# Patient Record
Sex: FEMALE | Race: OTHER | HISPANIC OR LATINO
[De-identification: names, ages, dates, MRNs, and addresses within clinical notes are randomized per-mention and may not be internally consistent; named-entity substitution may affect disease eponyms.]

---

## 2017-01-17 ENCOUNTER — APPOINTMENT (OUTPATIENT)
Dept: RHEUMATOLOGY | Facility: CLINIC | Age: 52
End: 2017-01-17

## 2017-01-17 VITALS
HEART RATE: 66 BPM | HEIGHT: 61 IN | WEIGHT: 112 LBS | SYSTOLIC BLOOD PRESSURE: 102 MMHG | OXYGEN SATURATION: 99 % | RESPIRATION RATE: 16 BRPM | DIASTOLIC BLOOD PRESSURE: 69 MMHG | BODY MASS INDEX: 21.14 KG/M2

## 2017-01-17 LAB
APPEARANCE: CLEAR
BACTERIA: NEGATIVE
BASOPHILS # BLD AUTO: 0.03 K/UL
BASOPHILS NFR BLD AUTO: 0.8 %
BILIRUBIN URINE: NEGATIVE
BLOOD URINE: NEGATIVE
COLOR: YELLOW
CREAT SPEC-SCNC: 52 MG/DL
CREAT/PROT UR: 0.5 RATIO
EOSINOPHIL # BLD AUTO: 0.05 K/UL
EOSINOPHIL NFR BLD AUTO: 1.3 %
GLUCOSE QUALITATIVE U: NORMAL MG/DL
HCT VFR BLD CALC: 35.9 %
HGB BLD-MCNC: 11.4 G/DL
IMM GRANULOCYTES NFR BLD AUTO: 0 %
KETONES URINE: NEGATIVE
LEUKOCYTE ESTERASE URINE: NEGATIVE
LYMPHOCYTES # BLD AUTO: 1.67 K/UL
LYMPHOCYTES NFR BLD AUTO: 43.8 %
MAN DIFF?: NORMAL
MCHC RBC-ENTMCNC: 28.1 PG
MCHC RBC-ENTMCNC: 31.8 GM/DL
MCV RBC AUTO: 88.6 FL
MICROSCOPIC-UA: NORMAL
MONOCYTES # BLD AUTO: 0.43 K/UL
MONOCYTES NFR BLD AUTO: 11.3 %
NEUTROPHILS # BLD AUTO: 1.63 K/UL
NEUTROPHILS NFR BLD AUTO: 42.8 %
NITRITE URINE: NEGATIVE
PH URINE: 7.5
PLATELET # BLD AUTO: 247 K/UL
PROT UR-MCNC: 26 MG/DL
PROTEIN URINE: 30 MG/DL
RBC # BLD: 4.05 M/UL
RBC # FLD: 13.8 %
RED BLOOD CELLS URINE: 1 /HPF
SPECIFIC GRAVITY URINE: 1.01
SQUAMOUS EPITHELIAL CELLS: 0 /HPF
UROBILINOGEN URINE: NORMAL MG/DL
WBC # FLD AUTO: 3.81 K/UL
WHITE BLOOD CELLS URINE: 0 /HPF

## 2017-01-18 LAB
ALBUMIN SERPL ELPH-MCNC: 4.1 G/DL
ALP BLD-CCNC: 85 U/L
ALT SERPL-CCNC: 11 U/L
ANION GAP SERPL CALC-SCNC: 18 MMOL/L
AST SERPL-CCNC: 20 U/L
BILIRUB SERPL-MCNC: 0.6 MG/DL
BUN SERPL-MCNC: 17 MG/DL
C3 SERPL-MCNC: 118 MG/DL
C4 SERPL-MCNC: 28 MG/DL
CALCIUM SERPL-MCNC: 10.4 MG/DL
CHLORIDE SERPL-SCNC: 100 MMOL/L
CK SERPL-CCNC: 130 U/L
CO2 SERPL-SCNC: 22 MMOL/L
CREAT SERPL-MCNC: 1.06 MG/DL
DSDNA AB SER-ACNC: <12 IU/ML
GLUCOSE SERPL-MCNC: 97 MG/DL
POTASSIUM SERPL-SCNC: 4.2 MMOL/L
PROT SERPL-MCNC: 7.2 G/DL
SODIUM SERPL-SCNC: 140 MMOL/L
TSH SERPL-ACNC: 1.85 UIU/ML

## 2017-01-19 LAB
ENA RNP AB SER IA-ACNC: 4.1 AL
ENA SM AB SER IA-ACNC: 0.2 AL
ENA SS-A AB SER IA-ACNC: <0.2 AL
ENA SS-B AB SER IA-ACNC: <0.2 AL

## 2017-01-30 ENCOUNTER — OUTPATIENT (OUTPATIENT)
Dept: OUTPATIENT SERVICES | Facility: HOSPITAL | Age: 52
LOS: 1 days | End: 2017-01-30
Payer: MEDICAID

## 2017-01-30 ENCOUNTER — APPOINTMENT (OUTPATIENT)
Dept: INTERNAL MEDICINE | Facility: CLINIC | Age: 52
End: 2017-01-30

## 2017-01-30 ENCOUNTER — LABORATORY RESULT (OUTPATIENT)
Age: 52
End: 2017-01-30

## 2017-01-30 VITALS
OXYGEN SATURATION: 99 % | HEART RATE: 77 BPM | RESPIRATION RATE: 14 BRPM | DIASTOLIC BLOOD PRESSURE: 70 MMHG | SYSTOLIC BLOOD PRESSURE: 102 MMHG

## 2017-01-30 VITALS
DIASTOLIC BLOOD PRESSURE: 70 MMHG | HEIGHT: 61 IN | WEIGHT: 108 LBS | BODY MASS INDEX: 20.39 KG/M2 | SYSTOLIC BLOOD PRESSURE: 100 MMHG | TEMPERATURE: 99 F

## 2017-01-30 DIAGNOSIS — I10 ESSENTIAL (PRIMARY) HYPERTENSION: ICD-10-CM

## 2017-01-30 PROCEDURE — G0463: CPT

## 2017-01-30 PROCEDURE — 87798 DETECT AGENT NOS DNA AMP: CPT

## 2017-01-30 PROCEDURE — 87486 CHLMYD PNEUM DNA AMP PROBE: CPT

## 2017-01-30 PROCEDURE — 87633 RESP VIRUS 12-25 TARGETS: CPT

## 2017-01-30 PROCEDURE — 87581 M.PNEUMON DNA AMP PROBE: CPT

## 2017-01-31 DIAGNOSIS — R68.89 OTHER GENERAL SYMPTOMS AND SIGNS: ICD-10-CM

## 2017-01-31 LAB — RAPID RVP RESULT: SIGNIFICANT CHANGE UP

## 2017-02-07 ENCOUNTER — APPOINTMENT (OUTPATIENT)
Dept: NEUROLOGY | Facility: HOSPITAL | Age: 52
End: 2017-02-07

## 2017-02-07 ENCOUNTER — APPOINTMENT (OUTPATIENT)
Dept: GASTROENTEROLOGY | Facility: HOSPITAL | Age: 52
End: 2017-02-07

## 2017-02-07 ENCOUNTER — OUTPATIENT (OUTPATIENT)
Dept: OUTPATIENT SERVICES | Facility: HOSPITAL | Age: 52
LOS: 1 days | End: 2017-02-07
Payer: MEDICAID

## 2017-02-07 VITALS
WEIGHT: 107 LBS | SYSTOLIC BLOOD PRESSURE: 108 MMHG | HEART RATE: 80 BPM | BODY MASS INDEX: 20.2 KG/M2 | DIASTOLIC BLOOD PRESSURE: 76 MMHG | HEIGHT: 61 IN

## 2017-02-07 VITALS
HEART RATE: 80 BPM | DIASTOLIC BLOOD PRESSURE: 76 MMHG | HEIGHT: 61 IN | WEIGHT: 107 LBS | SYSTOLIC BLOOD PRESSURE: 108 MMHG | BODY MASS INDEX: 20.2 KG/M2

## 2017-02-07 DIAGNOSIS — G43.909 MIGRAINE, UNSPECIFIED, NOT INTRACTABLE, WITHOUT STATUS MIGRAINOSUS: ICD-10-CM

## 2017-02-07 DIAGNOSIS — H83.2X3 LABYRINTHINE DYSFUNCTION, BILATERAL: ICD-10-CM

## 2017-02-07 DIAGNOSIS — R10.9 UNSPECIFIED ABDOMINAL PAIN: ICD-10-CM

## 2017-02-07 DIAGNOSIS — K21.9 GASTRO-ESOPHAGEAL REFLUX DISEASE WITHOUT ESOPHAGITIS: ICD-10-CM

## 2017-02-07 DIAGNOSIS — K31.9 DISEASE OF STOMACH AND DUODENUM, UNSPECIFIED: ICD-10-CM

## 2017-02-07 DIAGNOSIS — R56.9 UNSPECIFIED CONVULSIONS: ICD-10-CM

## 2017-02-07 DIAGNOSIS — F39 UNSPECIFIED MOOD [AFFECTIVE] DISORDER: ICD-10-CM

## 2017-02-07 PROCEDURE — G0463: CPT

## 2017-02-07 RX ORDER — ACETAMINOPHEN, DEXTROMETHORPHAN HYDROBROMIDE, DOXYLAMINE SUCCINATE, AND PHENYLEPHRINE HYDROCHLORIDE 10; 12.5; 20; 65 MG/30ML; MG/30ML; MG/30ML; MG/30ML
5-6.25-10-325 SOLUTION ORAL
Qty: 1 | Refills: 0 | Status: DISCONTINUED | COMMUNITY
Start: 2017-01-30 | End: 2017-02-07

## 2017-03-28 ENCOUNTER — RX RENEWAL (OUTPATIENT)
Age: 52
End: 2017-03-28

## 2017-05-02 ENCOUNTER — APPOINTMENT (OUTPATIENT)
Dept: GASTROENTEROLOGY | Facility: HOSPITAL | Age: 52
End: 2017-05-02

## 2017-05-02 ENCOUNTER — OUTPATIENT (OUTPATIENT)
Dept: OUTPATIENT SERVICES | Facility: HOSPITAL | Age: 52
LOS: 1 days | End: 2017-05-02
Payer: MEDICAID

## 2017-05-02 ENCOUNTER — APPOINTMENT (OUTPATIENT)
Dept: NEUROLOGY | Facility: HOSPITAL | Age: 52
End: 2017-05-02

## 2017-05-02 VITALS
HEIGHT: 61 IN | BODY MASS INDEX: 19.45 KG/M2 | RESPIRATION RATE: 14 BRPM | DIASTOLIC BLOOD PRESSURE: 68 MMHG | HEART RATE: 69 BPM | WEIGHT: 103 LBS | SYSTOLIC BLOOD PRESSURE: 108 MMHG

## 2017-05-02 VITALS
WEIGHT: 103 LBS | HEART RATE: 69 BPM | BODY MASS INDEX: 19.45 KG/M2 | HEIGHT: 61 IN | DIASTOLIC BLOOD PRESSURE: 68 MMHG | SYSTOLIC BLOOD PRESSURE: 108 MMHG

## 2017-05-02 DIAGNOSIS — R10.9 UNSPECIFIED ABDOMINAL PAIN: ICD-10-CM

## 2017-05-02 DIAGNOSIS — R13.10 DYSPHAGIA, UNSPECIFIED: ICD-10-CM

## 2017-05-02 DIAGNOSIS — K30 FUNCTIONAL DYSPEPSIA: ICD-10-CM

## 2017-05-02 DIAGNOSIS — K21.9 GASTRO-ESOPHAGEAL REFLUX DISEASE WITHOUT ESOPHAGITIS: ICD-10-CM

## 2017-05-02 DIAGNOSIS — R51 HEADACHE: ICD-10-CM

## 2017-05-02 DIAGNOSIS — G43.909 MIGRAINE, UNSPECIFIED, NOT INTRACTABLE, WITHOUT STATUS MIGRAINOSUS: ICD-10-CM

## 2017-05-02 PROCEDURE — 86235 NUCLEAR ANTIGEN ANTIBODY: CPT

## 2017-05-02 PROCEDURE — G0463: CPT

## 2017-05-02 PROCEDURE — 36415 COLL VENOUS BLD VENIPUNCTURE: CPT

## 2017-05-02 PROCEDURE — 86255 FLUORESCENT ANTIBODY SCREEN: CPT

## 2017-05-02 PROCEDURE — 86038 ANTINUCLEAR ANTIBODIES: CPT

## 2017-05-02 RX ORDER — AZITHROMYCIN 250 MG/1
250 TABLET, FILM COATED ORAL
Qty: 6 | Refills: 3 | Status: DISCONTINUED | COMMUNITY
Start: 2017-01-17 | End: 2017-05-02

## 2017-05-02 RX ORDER — BENZOCAINE/MENTH/CETYLPYRD CL 15 MG-4 MG
15-4 LOZENGE MUCOUS MEMBRANE
Qty: 1 | Refills: 0 | Status: DISCONTINUED | COMMUNITY
Start: 2017-01-30 | End: 2017-05-02

## 2017-05-03 LAB
CENTROMERE AB SER-ACNC: <0.2 AI — SIGNIFICANT CHANGE UP
ENA SCL70 AB SER-ACNC: 0.2 AI — SIGNIFICANT CHANGE UP

## 2017-05-04 LAB
ANA PAT FLD IF-IMP: ABNORMAL
ANA TITR SER: ABNORMAL

## 2017-05-16 ENCOUNTER — APPOINTMENT (OUTPATIENT)
Dept: DERMATOLOGY | Facility: CLINIC | Age: 52
End: 2017-05-16

## 2017-05-16 VITALS
DIASTOLIC BLOOD PRESSURE: 60 MMHG | SYSTOLIC BLOOD PRESSURE: 106 MMHG | HEIGHT: 63 IN | BODY MASS INDEX: 20.2 KG/M2 | WEIGHT: 114 LBS

## 2017-05-16 DIAGNOSIS — Z91.89 OTHER SPECIFIED PERSONAL RISK FACTORS, NOT ELSEWHERE CLASSIFIED: ICD-10-CM

## 2017-05-17 ENCOUNTER — APPOINTMENT (OUTPATIENT)
Dept: DERMATOLOGY | Facility: CLINIC | Age: 52
End: 2017-05-17

## 2017-05-17 VITALS — SYSTOLIC BLOOD PRESSURE: 100 MMHG | DIASTOLIC BLOOD PRESSURE: 70 MMHG

## 2017-05-19 ENCOUNTER — APPOINTMENT (OUTPATIENT)
Dept: DERMATOLOGY | Facility: CLINIC | Age: 52
End: 2017-05-19

## 2017-05-26 ENCOUNTER — APPOINTMENT (OUTPATIENT)
Dept: RHEUMATOLOGY | Facility: CLINIC | Age: 52
End: 2017-05-26

## 2017-05-26 ENCOUNTER — LABORATORY RESULT (OUTPATIENT)
Age: 52
End: 2017-05-26

## 2017-05-26 VITALS
HEART RATE: 59 BPM | OXYGEN SATURATION: 96 % | DIASTOLIC BLOOD PRESSURE: 68 MMHG | WEIGHT: 115 LBS | SYSTOLIC BLOOD PRESSURE: 104 MMHG | HEIGHT: 63 IN | BODY MASS INDEX: 20.38 KG/M2

## 2017-05-26 LAB
25(OH)D3 SERPL-MCNC: 48 NG/ML
ALBUMIN SERPL ELPH-MCNC: 4.1 G/DL
ALP BLD-CCNC: 98 U/L
ALT SERPL-CCNC: 8 U/L
ANION GAP SERPL CALC-SCNC: 17 MMOL/L
APPEARANCE: CLEAR
AST SERPL-CCNC: 15 U/L
BACTERIA: NEGATIVE
BASOPHILS # BLD AUTO: 0.01 K/UL
BASOPHILS NFR BLD AUTO: 0.4 %
BILIRUB SERPL-MCNC: 0.5 MG/DL
BILIRUBIN URINE: NEGATIVE
BLOOD URINE: NEGATIVE
BUN SERPL-MCNC: 16 MG/DL
CALCIUM SERPL-MCNC: 9.5 MG/DL
CHLORIDE SERPL-SCNC: 103 MMOL/L
CHOLEST SERPL-MCNC: 179 MG/DL
CHOLEST/HDLC SERPL: 3 RATIO
CK SERPL-CCNC: 49 U/L
CO2 SERPL-SCNC: 20 MMOL/L
COLOR: YELLOW
CREAT SERPL-MCNC: 1.26 MG/DL
CREAT SPEC-SCNC: 97 MG/DL
CREAT/PROT UR: 0.4 RATIO
ENA SCL70 IGG SER IA-ACNC: 0.2 AL
EOSINOPHIL # BLD AUTO: 0.03 K/UL
EOSINOPHIL NFR BLD AUTO: 1.1 %
GLUCOSE QUALITATIVE U: NORMAL MG/DL
GLUCOSE SERPL-MCNC: 92 MG/DL
HCT VFR BLD CALC: 34.8 %
HDLC SERPL-MCNC: 59 MG/DL
HGB BLD-MCNC: 11 G/DL
HYALINE CASTS: 1 /LPF
IMM GRANULOCYTES NFR BLD AUTO: 0 %
KETONES URINE: NEGATIVE
LDLC SERPL CALC-MCNC: 95 MG/DL
LEUKOCYTE ESTERASE URINE: NEGATIVE
LYMPHOCYTES # BLD AUTO: 1.05 K/UL
LYMPHOCYTES NFR BLD AUTO: 37 %
MAN DIFF?: NORMAL
MCHC RBC-ENTMCNC: 27.8 PG
MCHC RBC-ENTMCNC: 31.6 GM/DL
MCV RBC AUTO: 88.1 FL
MICROSCOPIC-UA: NORMAL
MONOCYTES # BLD AUTO: 0.44 K/UL
MONOCYTES NFR BLD AUTO: 15.5 %
NEUTROPHILS # BLD AUTO: 1.31 K/UL
NEUTROPHILS NFR BLD AUTO: 46 %
NITRITE URINE: NEGATIVE
PH URINE: 6
PLATELET # BLD AUTO: 240 K/UL
POTASSIUM SERPL-SCNC: 4.8 MMOL/L
PROT SERPL-MCNC: 7.2 G/DL
PROT UR-MCNC: 41 MG/DL
PROTEIN URINE: 30 MG/DL
RBC # BLD: 3.95 M/UL
RBC # FLD: 13.4 %
RED BLOOD CELLS URINE: 2 /HPF
SODIUM SERPL-SCNC: 140 MMOL/L
SPECIFIC GRAVITY URINE: 1.02
SQUAMOUS EPITHELIAL CELLS: 0 /HPF
TRIGL SERPL-MCNC: 126 MG/DL
TSH SERPL-ACNC: 2.5 UIU/ML
UROBILINOGEN URINE: NORMAL MG/DL
WBC # FLD AUTO: 2.84 K/UL
WHITE BLOOD CELLS URINE: 1 /HPF

## 2017-05-26 RX ORDER — NORTRIPTYLINE HYDROCHLORIDE 50 MG/1
50 CAPSULE ORAL
Qty: 30 | Refills: 2 | Status: DISCONTINUED | COMMUNITY
Start: 2017-02-07 | End: 2017-05-26

## 2017-05-27 LAB
C3 SERPL-MCNC: 104 MG/DL
C4 SERPL-MCNC: 25 MG/DL

## 2017-05-28 LAB — DSDNA AB SER-ACNC: <12 IU/ML

## 2017-06-05 ENCOUNTER — APPOINTMENT (OUTPATIENT)
Dept: DERMATOLOGY | Facility: CLINIC | Age: 52
End: 2017-06-05

## 2017-07-01 ENCOUNTER — OUTPATIENT (OUTPATIENT)
Dept: OUTPATIENT SERVICES | Facility: HOSPITAL | Age: 52
LOS: 1 days | End: 2017-07-01
Payer: MEDICAID

## 2017-07-25 ENCOUNTER — RX RENEWAL (OUTPATIENT)
Age: 52
End: 2017-07-25

## 2017-07-25 ENCOUNTER — APPOINTMENT (OUTPATIENT)
Dept: NEUROLOGY | Facility: HOSPITAL | Age: 52
End: 2017-07-25

## 2017-07-25 ENCOUNTER — APPOINTMENT (OUTPATIENT)
Dept: GASTROENTEROLOGY | Facility: HOSPITAL | Age: 52
End: 2017-07-25

## 2017-07-25 ENCOUNTER — OUTPATIENT (OUTPATIENT)
Dept: OUTPATIENT SERVICES | Facility: HOSPITAL | Age: 52
LOS: 1 days | End: 2017-07-25
Payer: MEDICAID

## 2017-07-25 VITALS
SYSTOLIC BLOOD PRESSURE: 103 MMHG | WEIGHT: 113 LBS | HEART RATE: 68 BPM | BODY MASS INDEX: 20.02 KG/M2 | HEIGHT: 63 IN | DIASTOLIC BLOOD PRESSURE: 66 MMHG

## 2017-07-25 VITALS
WEIGHT: 113 LBS | BODY MASS INDEX: 20.02 KG/M2 | SYSTOLIC BLOOD PRESSURE: 103 MMHG | DIASTOLIC BLOOD PRESSURE: 66 MMHG | HEART RATE: 68 BPM | HEIGHT: 63 IN

## 2017-07-25 DIAGNOSIS — R10.9 UNSPECIFIED ABDOMINAL PAIN: ICD-10-CM

## 2017-07-25 DIAGNOSIS — R51 HEADACHE: ICD-10-CM

## 2017-07-25 DIAGNOSIS — G43.909 MIGRAINE, UNSPECIFIED, NOT INTRACTABLE, WITHOUT STATUS MIGRAINOSUS: ICD-10-CM

## 2017-07-25 DIAGNOSIS — K21.9 GASTRO-ESOPHAGEAL REFLUX DISEASE WITHOUT ESOPHAGITIS: ICD-10-CM

## 2017-07-25 DIAGNOSIS — R69 ILLNESS, UNSPECIFIED: ICD-10-CM

## 2017-07-25 PROCEDURE — G0463: CPT

## 2017-07-25 RX ORDER — POTASSIUM BICARB/MAG COMBO 21 500-300 MG
300-500 POWDER EFFERVESCENT (GRAM) ORAL
Qty: 1 | Refills: 0 | Status: COMPLETED | COMMUNITY
Start: 2017-07-25

## 2017-07-25 RX ORDER — METHYLPREDNISOLONE 4 MG/1
4 TABLET ORAL
Qty: 21 | Refills: 0 | Status: COMPLETED | COMMUNITY
Start: 2017-05-02 | End: 2017-07-25

## 2017-07-27 ENCOUNTER — RX RENEWAL (OUTPATIENT)
Age: 52
End: 2017-07-27

## 2017-07-29 ENCOUNTER — EMERGENCY (EMERGENCY)
Facility: HOSPITAL | Age: 52
LOS: 1 days | Discharge: HOME | End: 2017-07-29
Attending: EMERGENCY MEDICINE | Admitting: EMERGENCY MEDICINE
Payer: MEDICAID

## 2017-07-29 VITALS
HEART RATE: 88 BPM | OXYGEN SATURATION: 98 % | DIASTOLIC BLOOD PRESSURE: 94 MMHG | TEMPERATURE: 98 F | RESPIRATION RATE: 20 BRPM | SYSTOLIC BLOOD PRESSURE: 134 MMHG

## 2017-07-29 VITALS
HEART RATE: 64 BPM | DIASTOLIC BLOOD PRESSURE: 80 MMHG | SYSTOLIC BLOOD PRESSURE: 119 MMHG | RESPIRATION RATE: 18 BRPM | OXYGEN SATURATION: 96 %

## 2017-07-29 LAB
ALBUMIN SERPL ELPH-MCNC: 4.3 G/DL — SIGNIFICANT CHANGE UP (ref 3.3–5)
ALP SERPL-CCNC: 85 U/L — SIGNIFICANT CHANGE UP (ref 40–120)
ALT FLD-CCNC: 13 U/L RC — SIGNIFICANT CHANGE UP (ref 10–45)
ANION GAP SERPL CALC-SCNC: 13 MMOL/L — SIGNIFICANT CHANGE UP (ref 5–17)
AST SERPL-CCNC: 20 U/L — SIGNIFICANT CHANGE UP (ref 10–40)
BASOPHILS # BLD AUTO: 0.1 K/UL — SIGNIFICANT CHANGE UP (ref 0–0.2)
BASOPHILS NFR BLD AUTO: 1.7 % — SIGNIFICANT CHANGE UP (ref 0–2)
BILIRUB SERPL-MCNC: 0.6 MG/DL — SIGNIFICANT CHANGE UP (ref 0.2–1.2)
BUN SERPL-MCNC: 18 MG/DL — SIGNIFICANT CHANGE UP (ref 7–23)
CALCIUM SERPL-MCNC: 9.7 MG/DL — SIGNIFICANT CHANGE UP (ref 8.4–10.5)
CHLORIDE SERPL-SCNC: 108 MMOL/L — SIGNIFICANT CHANGE UP (ref 96–108)
CO2 SERPL-SCNC: 22 MMOL/L — SIGNIFICANT CHANGE UP (ref 22–31)
CREAT SERPL-MCNC: 1.08 MG/DL — SIGNIFICANT CHANGE UP (ref 0.5–1.3)
EOSINOPHIL # BLD AUTO: 0 K/UL — SIGNIFICANT CHANGE UP (ref 0–0.5)
EOSINOPHIL NFR BLD AUTO: 0.6 % — SIGNIFICANT CHANGE UP (ref 0–6)
GLUCOSE SERPL-MCNC: 89 MG/DL — SIGNIFICANT CHANGE UP (ref 70–99)
HCG SERPL-ACNC: 3.7 MIU/ML — SIGNIFICANT CHANGE UP
HCT VFR BLD CALC: 37.1 % — SIGNIFICANT CHANGE UP (ref 34.5–45)
HGB BLD-MCNC: 12.3 G/DL — SIGNIFICANT CHANGE UP (ref 11.5–15.5)
LYMPHOCYTES # BLD AUTO: 1.8 K/UL — SIGNIFICANT CHANGE UP (ref 1–3.3)
LYMPHOCYTES # BLD AUTO: 47.4 % — HIGH (ref 13–44)
MCHC RBC-ENTMCNC: 29.8 PG — SIGNIFICANT CHANGE UP (ref 27–34)
MCHC RBC-ENTMCNC: 33.1 GM/DL — SIGNIFICANT CHANGE UP (ref 32–36)
MCV RBC AUTO: 89.9 FL — SIGNIFICANT CHANGE UP (ref 80–100)
MONOCYTES # BLD AUTO: 0.5 K/UL — SIGNIFICANT CHANGE UP (ref 0–0.9)
MONOCYTES NFR BLD AUTO: 12.3 % — SIGNIFICANT CHANGE UP (ref 2–14)
NEUTROPHILS # BLD AUTO: 1.4 K/UL — LOW (ref 1.8–7.4)
NEUTROPHILS NFR BLD AUTO: 38 % — LOW (ref 43–77)
PLATELET # BLD AUTO: 232 K/UL — SIGNIFICANT CHANGE UP (ref 150–400)
POTASSIUM SERPL-MCNC: 4.3 MMOL/L — SIGNIFICANT CHANGE UP (ref 3.5–5.3)
POTASSIUM SERPL-SCNC: 4.3 MMOL/L — SIGNIFICANT CHANGE UP (ref 3.5–5.3)
PROT SERPL-MCNC: 7.7 G/DL — SIGNIFICANT CHANGE UP (ref 6–8.3)
RBC # BLD: 4.13 M/UL — SIGNIFICANT CHANGE UP (ref 3.8–5.2)
RBC # FLD: 12.5 % — SIGNIFICANT CHANGE UP (ref 10.3–14.5)
SODIUM SERPL-SCNC: 143 MMOL/L — SIGNIFICANT CHANGE UP (ref 135–145)
WBC # BLD: 3.8 K/UL — SIGNIFICANT CHANGE UP (ref 3.8–10.5)
WBC # FLD AUTO: 3.8 K/UL — SIGNIFICANT CHANGE UP (ref 3.8–10.5)

## 2017-07-29 PROCEDURE — 96374 THER/PROPH/DIAG INJ IV PUSH: CPT

## 2017-07-29 PROCEDURE — 99284 EMERGENCY DEPT VISIT MOD MDM: CPT | Mod: 25

## 2017-07-29 PROCEDURE — 70450 CT HEAD/BRAIN W/O DYE: CPT

## 2017-07-29 PROCEDURE — 96375 TX/PRO/DX INJ NEW DRUG ADDON: CPT

## 2017-07-29 PROCEDURE — 70450 CT HEAD/BRAIN W/O DYE: CPT | Mod: 26

## 2017-07-29 PROCEDURE — 80053 COMPREHEN METABOLIC PANEL: CPT

## 2017-07-29 PROCEDURE — 84702 CHORIONIC GONADOTROPIN TEST: CPT

## 2017-07-29 PROCEDURE — 85027 COMPLETE CBC AUTOMATED: CPT

## 2017-07-29 PROCEDURE — 99285 EMERGENCY DEPT VISIT HI MDM: CPT

## 2017-07-29 RX ORDER — ACETAMINOPHEN 500 MG
1000 TABLET ORAL ONCE
Qty: 0 | Refills: 0 | Status: COMPLETED | OUTPATIENT
Start: 2017-07-29 | End: 2017-07-29

## 2017-07-29 RX ORDER — SODIUM CHLORIDE 9 MG/ML
1000 INJECTION INTRAMUSCULAR; INTRAVENOUS; SUBCUTANEOUS ONCE
Qty: 0 | Refills: 0 | Status: COMPLETED | OUTPATIENT
Start: 2017-07-29 | End: 2017-07-29

## 2017-07-29 RX ORDER — MAGNESIUM SULFATE 500 MG/ML
2 VIAL (ML) INJECTION ONCE
Qty: 0 | Refills: 0 | Status: COMPLETED | OUTPATIENT
Start: 2017-07-29 | End: 2017-07-29

## 2017-07-29 RX ORDER — MECLIZINE HCL 12.5 MG
25 TABLET ORAL ONCE
Qty: 0 | Refills: 0 | Status: COMPLETED | OUTPATIENT
Start: 2017-07-29 | End: 2017-07-29

## 2017-07-29 RX ORDER — METOCLOPRAMIDE HCL 10 MG
10 TABLET ORAL ONCE
Qty: 0 | Refills: 0 | Status: COMPLETED | OUTPATIENT
Start: 2017-07-29 | End: 2017-07-29

## 2017-07-29 RX ADMIN — SODIUM CHLORIDE 1000 MILLILITER(S): 9 INJECTION INTRAMUSCULAR; INTRAVENOUS; SUBCUTANEOUS at 21:10

## 2017-07-29 RX ADMIN — Medication 25 MILLIGRAM(S): at 21:16

## 2017-07-29 RX ADMIN — Medication 400 MILLIGRAM(S): at 22:02

## 2017-07-29 RX ADMIN — Medication 50 GRAM(S): at 22:15

## 2017-07-29 RX ADMIN — Medication 1000 MILLIGRAM(S): at 22:31

## 2017-07-29 RX ADMIN — Medication 10 MILLIGRAM(S): at 21:10

## 2017-07-29 NOTE — ED PROVIDER NOTE - OBJECTIVE STATEMENT
53yo female p/w headache for several months worse since yesterday. Pt. reports frontal headache, constant, no aggravating/alleviating factors. Pt. reports nausea and occasional dizziness. Pt. repots chills. Denies trauma, sick contacts, dysuria, cough, vomiting.

## 2017-07-29 NOTE — ED ADULT NURSE NOTE - PMH
Depression    GERD (Gastroesophageal Reflux Disease)    Hypothyroidism    Lupus (Systemic Lupus Erythematosus)    Migraine    Nephritis    Pericarditis    Vertigo

## 2017-07-29 NOTE — ED PROVIDER NOTE - ATTENDING CONTRIBUTION TO CARE
52 yof pmhx lupus nephritis on cellcept, chronic migraines,  had an MRI approx 1 yr ago w her neurologist which was normal per pt, presents with one month of intermittent headaches. no f/c, no neck pain.  pain is in bilat temples, no change in vision. has baseline very minimal vision to right eye with a "lazy eye" and strabismus since childhood.  has been taking tylenol and klonopin for pain w minimal relief. saw her neuro last week who started her on magnesium po pills and coenzyme q for her sxs which she states was helping some. no f/c, no trauma or falls.  couldn't sleep last night which is why she came to ED.  pain is similar to prior migraines in past.     ROS:   constitutional - no fever, no chills  eyes -no acute visual changes, no redness  eent - no sore throat, no nasal congestion  cvs - no chest pain, no leg swelling  resp - no shortness of breath, no cough  gi - no abdominal pain, no vomiting, no diarrhea  gu - no dysuria, no hematuria  msk - no acute back pain, no joint swelling  skin - no rashes, no jaundice  neuro - + headache, no focal weakness  psych - no acute mental health issue     Physical Exam:   constitutional - well appearing, awake and alert, oriented x3  head - no external evidence of trauma  ent - chronic right eye mild strabismus w vision to gross objects very close to face only, left eye vision grossly normal.   cvs - rrr, no murmurs, no peripheral edema  resp - breath sounds clear and equal bilat  gi - abdomen soft and nontender, no rigidity, guarding or rebound, bowel sounds present  msk - moving all extremities spontaneously  neuro - alert and oriented x3, no focal deficits, CNs 2-12 grossly intact, strength 5/5 in all ext, no pronator drift  skin- no jaundice, warm and dry  psych - mood and affect wnl, no apparent risk to self or others     likely migraine. do not suspect lupus-related complication as pt w/o focal deficits, no f/c, describes headache as being similar to prior migraines. OSVALDO Willis MD 52 yof pmhx lupus nephritis on cellcept, chronic migraines,  had an MRI approx 1 yr ago w her neurologist which was normal per pt, presents with one month of intermittent headaches. no f/c, no neck pain.  pain is in bilat temples, no change in vision. has baseline very minimal vision to right eye with a "lazy eye" and strabismus since childhood.  has been taking tylenol and klonopin for pain w minimal relief. saw her neuro last week who started her on magnesium po pills and coenzyme q for her sxs which she states was helping some. no f/c, no trauma or falls.  couldn't sleep last night which is why she came to ED.  pain is similar to prior migraines in past.     ROS:   constitutional - no fever, no chills  eyes -no acute visual changes, no redness  eent - no sore throat, no nasal congestion  cvs - no chest pain, no leg swelling  resp - no shortness of breath, no cough  gi - no abdominal pain, no vomiting, no diarrhea  gu - no dysuria, no hematuria  msk - no acute back pain, no joint swelling  skin - no rashes, no jaundice  neuro - + headache, no focal weakness  psych - no acute mental health issue     Physical Exam:   constitutional - well appearing, awake and alert, oriented x3  head - no external evidence of trauma  ent - chronic right eye mild strabismus w vision to gross objects very close to face only, left eye vision grossly normal.   cvs - rrr, no murmurs, no peripheral edema  resp - breath sounds clear and equal bilat  gi - abdomen soft and nontender, no rigidity, guarding or rebound, bowel sounds present  msk - moving all extremities spontaneously  neuro - alert and oriented x3, no focal deficits, CNs 2-12 grossly intact, strength 5/5 in all ext, no pronator drift  skin- no jaundice, warm and dry  psych - mood and affect wnl, no apparent risk to self or others     likely migraine. do not suspect lupus-related complication as pt w/o focal deficits, no f/c, describes headache as being similar to prior migraines. pt endorsed to Dr. Ceja pending ct, med administation and reasessment. OSVALDO Willis MD

## 2017-07-29 NOTE — ED PROVIDER NOTE - CARE PLAN
Instructions for follow-up, activity and diet:	You were seen in the Emergency Department for headache. Your examination and lab tests were reassuring. Please follow up with your regular physician this week for reevaluation. Please follow up with neurology as soon as possible for further evaluation. Take tylenol and motrin as needed for pain. Please return to the Emergency Department if you have any new concerning symptoms such as severe pain, weakness, or any other concerning symptoms. Principal Discharge DX:	Migraine  Instructions for follow-up, activity and diet:	You were seen in the Emergency Department for headache. Your examination and lab tests were reassuring. Please follow up with your regular physician this week for reevaluation. Please follow up with neurology as soon as possible for further evaluation. Take tylenol and motrin as needed for pain. Please return to the Emergency Department if you have any new concerning symptoms such as severe pain, weakness, or any other concerning symptoms.

## 2017-07-29 NOTE — ED ADULT NURSE NOTE - OBJECTIVE STATEMENT
51 y/o female hx of lupus, depression came inc/o headache, dizziness & nausea since yesterday. No CP or SOB, no fever/chills 51 y/o female hx of lupus, depression came inc/o headache, dizziness & nausea since yesterday. PERRL, neuro intact, no CP or SOB, no fever/chills. Safety maintained & continue monitor

## 2017-07-29 NOTE — ED PROVIDER NOTE - PLAN OF CARE
You were seen in the Emergency Department for headache. Your examination and lab tests were reassuring. Please follow up with your regular physician this week for reevaluation. Please follow up with neurology as soon as possible for further evaluation. Take tylenol and motrin as needed for pain. Please return to the Emergency Department if you have any new concerning symptoms such as severe pain, weakness, or any other concerning symptoms.

## 2017-08-01 ENCOUNTER — RX RENEWAL (OUTPATIENT)
Age: 52
End: 2017-08-01

## 2017-08-04 ENCOUNTER — OUTPATIENT (OUTPATIENT)
Dept: OUTPATIENT SERVICES | Facility: HOSPITAL | Age: 52
LOS: 1 days | End: 2017-08-04
Payer: MEDICAID

## 2017-08-04 ENCOUNTER — APPOINTMENT (OUTPATIENT)
Dept: INTERNAL MEDICINE | Facility: CLINIC | Age: 52
End: 2017-08-04
Payer: MEDICAID

## 2017-08-04 VITALS
HEIGHT: 63 IN | SYSTOLIC BLOOD PRESSURE: 100 MMHG | WEIGHT: 114 LBS | DIASTOLIC BLOOD PRESSURE: 60 MMHG | BODY MASS INDEX: 20.2 KG/M2

## 2017-08-04 DIAGNOSIS — R51 HEADACHE: ICD-10-CM

## 2017-08-04 DIAGNOSIS — I10 ESSENTIAL (PRIMARY) HYPERTENSION: ICD-10-CM

## 2017-08-04 DIAGNOSIS — K22.2 ESOPHAGEAL OBSTRUCTION: ICD-10-CM

## 2017-08-04 DIAGNOSIS — E03.9 HYPOTHYROIDISM, UNSPECIFIED: ICD-10-CM

## 2017-08-04 PROCEDURE — G0463: CPT

## 2017-08-04 PROCEDURE — 99214 OFFICE O/P EST MOD 30 MIN: CPT | Mod: GC

## 2017-08-08 ENCOUNTER — APPOINTMENT (OUTPATIENT)
Dept: RHEUMATOLOGY | Facility: CLINIC | Age: 52
End: 2017-08-08
Payer: MEDICAID

## 2017-08-08 ENCOUNTER — MEDICATION RENEWAL (OUTPATIENT)
Age: 52
End: 2017-08-08

## 2017-08-08 VITALS
HEIGHT: 63 IN | OXYGEN SATURATION: 98 % | WEIGHT: 110 LBS | DIASTOLIC BLOOD PRESSURE: 75 MMHG | TEMPERATURE: 98.4 F | BODY MASS INDEX: 19.49 KG/M2 | SYSTOLIC BLOOD PRESSURE: 105 MMHG | HEART RATE: 69 BPM

## 2017-08-08 LAB
APPEARANCE: CLEAR
BACTERIA: NEGATIVE
BILIRUBIN URINE: NEGATIVE
BLOOD URINE: NEGATIVE
CK SERPL-CCNC: 50 U/L
COLOR: YELLOW
CREAT SPEC-SCNC: 118 MG/DL
CREAT/PROT UR: 0.4 RATIO
GLUCOSE QUALITATIVE U: NORMAL MG/DL
HYALINE CASTS: 0 /LPF
KETONES URINE: NEGATIVE
LEUKOCYTE ESTERASE URINE: NEGATIVE
MICROSCOPIC-UA: NORMAL
NITRITE URINE: NEGATIVE
PH URINE: 6.5
PROT UR-MCNC: 44 MG/DL
PROTEIN URINE: 100 MG/DL
RED BLOOD CELLS URINE: 2 /HPF
SPECIFIC GRAVITY URINE: 1.02
SQUAMOUS EPITHELIAL CELLS: 0 /HPF
TSH SERPL-ACNC: 3.82 UIU/ML
UROBILINOGEN URINE: NORMAL MG/DL
WHITE BLOOD CELLS URINE: 0 /HPF

## 2017-08-08 PROCEDURE — 99214 OFFICE O/P EST MOD 30 MIN: CPT

## 2017-08-09 LAB
C3 SERPL-MCNC: 105 MG/DL
C4 SERPL-MCNC: 25 MG/DL
DSDNA AB SER-ACNC: 14 IU/ML
ENA RNP AB SER IA-ACNC: 3.1 AL
ENA SM AB SER IA-ACNC: 0.2 AL
ENA SS-A AB SER IA-ACNC: <0.2 AL
ENA SS-B AB SER IA-ACNC: <0.2 AL

## 2017-08-10 ENCOUNTER — RX RENEWAL (OUTPATIENT)
Age: 52
End: 2017-08-10

## 2017-08-10 RX ORDER — UBIDECARENONE 100 %
POWDER (GRAM) MISCELLANEOUS
Qty: 30 | Refills: 3 | Status: DISCONTINUED | COMMUNITY
Start: 2017-02-07 | End: 2017-08-10

## 2017-08-28 ENCOUNTER — APPOINTMENT (OUTPATIENT)
Dept: RHEUMATOLOGY | Facility: CLINIC | Age: 52
End: 2017-08-28

## 2017-08-30 ENCOUNTER — RX RENEWAL (OUTPATIENT)
Age: 52
End: 2017-08-30

## 2017-09-06 LAB
ALBUMIN SERPL ELPH-MCNC: 4.3 G/DL
ALP BLD-CCNC: 85 U/L
ALT SERPL-CCNC: 14 U/L
ANION GAP SERPL CALC-SCNC: 16 MMOL/L
AST SERPL-CCNC: 22 U/L
BASOPHILS # BLD AUTO: 0.01 K/UL
BASOPHILS NFR BLD AUTO: 0.3 %
BILIRUB SERPL-MCNC: 0.6 MG/DL
BUN SERPL-MCNC: 24 MG/DL
CALCIUM SERPL-MCNC: 10.3 MG/DL
CHLORIDE SERPL-SCNC: 104 MMOL/L
CO2 SERPL-SCNC: 22 MMOL/L
CREAT SERPL-MCNC: 1.12 MG/DL
EOSINOPHIL # BLD AUTO: 0.08 K/UL
EOSINOPHIL NFR BLD AUTO: 2.2 %
GLUCOSE SERPL-MCNC: 84 MG/DL
HBA1C MFR BLD HPLC: 5.5 %
HCT VFR BLD CALC: 35.6 %
HGB BLD-MCNC: 11.2 G/DL
IMM GRANULOCYTES NFR BLD AUTO: 0 %
LYMPHOCYTES # BLD AUTO: 1.71 K/UL
LYMPHOCYTES NFR BLD AUTO: 47.9 %
MAN DIFF?: NORMAL
MCHC RBC-ENTMCNC: 27.7 PG
MCHC RBC-ENTMCNC: 31.5 GM/DL
MCV RBC AUTO: 88.1 FL
MONOCYTES # BLD AUTO: 0.43 K/UL
MONOCYTES NFR BLD AUTO: 12 %
NEUTROPHILS # BLD AUTO: 1.34 K/UL
NEUTROPHILS NFR BLD AUTO: 37.6 %
PLATELET # BLD AUTO: 266 K/UL
POTASSIUM SERPL-SCNC: 4 MMOL/L
PROT SERPL-MCNC: 7.3 G/DL
RBC # BLD: 4.04 M/UL
RBC # FLD: 14.1 %
SODIUM SERPL-SCNC: 142 MMOL/L
WBC # FLD AUTO: 3.57 K/UL

## 2017-09-08 ENCOUNTER — APPOINTMENT (OUTPATIENT)
Dept: INTERNAL MEDICINE | Facility: CLINIC | Age: 52
End: 2017-09-08

## 2017-09-14 ENCOUNTER — LABORATORY RESULT (OUTPATIENT)
Age: 52
End: 2017-09-14

## 2017-09-14 ENCOUNTER — OUTPATIENT (OUTPATIENT)
Dept: OUTPATIENT SERVICES | Facility: HOSPITAL | Age: 52
LOS: 1 days | End: 2017-09-14
Payer: MEDICAID

## 2017-09-14 ENCOUNTER — APPOINTMENT (OUTPATIENT)
Dept: OBGYN | Facility: CLINIC | Age: 52
End: 2017-09-14
Payer: MEDICAID

## 2017-09-14 VITALS
SYSTOLIC BLOOD PRESSURE: 90 MMHG | WEIGHT: 112 LBS | HEIGHT: 63 IN | BODY MASS INDEX: 19.84 KG/M2 | DIASTOLIC BLOOD PRESSURE: 70 MMHG

## 2017-09-14 DIAGNOSIS — N76.0 ACUTE VAGINITIS: ICD-10-CM

## 2017-09-14 PROCEDURE — G0463: CPT

## 2017-09-14 PROCEDURE — 82270 OCCULT BLOOD FECES: CPT | Mod: NC

## 2017-09-14 PROCEDURE — 99214 OFFICE O/P EST MOD 30 MIN: CPT | Mod: NC

## 2017-09-15 LAB
C TRACH RRNA SPEC QL NAA+PROBE: SIGNIFICANT CHANGE UP
N GONORRHOEA RRNA SPEC QL NAA+PROBE: SIGNIFICANT CHANGE UP
SPECIMEN SOURCE: SIGNIFICANT CHANGE UP

## 2017-09-21 DIAGNOSIS — N95.2 POSTMENOPAUSAL ATROPHIC VAGINITIS: ICD-10-CM

## 2017-09-21 DIAGNOSIS — Z78.0 ASYMPTOMATIC MENOPAUSAL STATE: ICD-10-CM

## 2017-10-16 ENCOUNTER — OTHER (OUTPATIENT)
Age: 52
End: 2017-10-16

## 2017-10-18 ENCOUNTER — APPOINTMENT (OUTPATIENT)
Dept: INTERNAL MEDICINE | Facility: CLINIC | Age: 52
End: 2017-10-18

## 2017-11-01 ENCOUNTER — OTHER (OUTPATIENT)
Age: 52
End: 2017-11-01

## 2017-11-07 ENCOUNTER — OUTPATIENT (OUTPATIENT)
Dept: OUTPATIENT SERVICES | Facility: HOSPITAL | Age: 52
LOS: 1 days | End: 2017-11-07
Payer: MEDICAID

## 2017-11-07 ENCOUNTER — APPOINTMENT (OUTPATIENT)
Dept: GASTROENTEROLOGY | Facility: HOSPITAL | Age: 52
End: 2017-11-07
Payer: MEDICAID

## 2017-11-07 ENCOUNTER — APPOINTMENT (OUTPATIENT)
Dept: NEUROLOGY | Facility: HOSPITAL | Age: 52
End: 2017-11-07

## 2017-11-07 VITALS
DIASTOLIC BLOOD PRESSURE: 76 MMHG | HEIGHT: 63 IN | BODY MASS INDEX: 20.2 KG/M2 | HEART RATE: 78 BPM | WEIGHT: 114 LBS | SYSTOLIC BLOOD PRESSURE: 116 MMHG

## 2017-11-07 DIAGNOSIS — R74.8 ABNORMAL LEVELS OF OTHER SERUM ENZYMES: ICD-10-CM

## 2017-11-07 DIAGNOSIS — K22.2 ESOPHAGEAL OBSTRUCTION: ICD-10-CM

## 2017-11-07 DIAGNOSIS — Z87.19 PERSONAL HISTORY OF OTHER DISEASES OF THE DIGESTIVE SYSTEM: ICD-10-CM

## 2017-11-07 DIAGNOSIS — Z80.0 FAMILY HISTORY OF MALIGNANT NEOPLASM OF DIGESTIVE ORGANS: ICD-10-CM

## 2017-11-07 DIAGNOSIS — K21.9 GASTRO-ESOPHAGEAL REFLUX DISEASE W/OUT ESOPHAGITIS: ICD-10-CM

## 2017-11-07 DIAGNOSIS — R10.12 LEFT UPPER QUADRANT PAIN: ICD-10-CM

## 2017-11-07 DIAGNOSIS — K31.9 DISEASE OF STOMACH AND DUODENUM, UNSPECIFIED: ICD-10-CM

## 2017-11-07 DIAGNOSIS — R51 HEADACHE: ICD-10-CM

## 2017-11-07 DIAGNOSIS — R19.4 CHANGE IN BOWEL HABIT: ICD-10-CM

## 2017-11-07 DIAGNOSIS — R10.13 EPIGASTRIC PAIN: ICD-10-CM

## 2017-11-07 DIAGNOSIS — K21.9 GASTRO-ESOPHAGEAL REFLUX DISEASE WITHOUT ESOPHAGITIS: ICD-10-CM

## 2017-11-07 DIAGNOSIS — R10.9 UNSPECIFIED ABDOMINAL PAIN: ICD-10-CM

## 2017-11-07 LAB
CRP SERPL-MCNC: 0.3 MG/DL — SIGNIFICANT CHANGE UP (ref 0–0.4)
ERYTHROCYTE [SEDIMENTATION RATE] IN BLOOD: 36 MM/HR — HIGH (ref 0–20)

## 2017-11-07 PROCEDURE — 36415 COLL VENOUS BLD VENIPUNCTURE: CPT

## 2017-11-07 PROCEDURE — G0463: CPT

## 2017-11-07 PROCEDURE — 85652 RBC SED RATE AUTOMATED: CPT

## 2017-11-07 PROCEDURE — 86140 C-REACTIVE PROTEIN: CPT

## 2017-11-07 PROCEDURE — 99213 OFFICE O/P EST LOW 20 MIN: CPT | Mod: GC

## 2017-11-07 RX ORDER — RIBOFLAVIN (VITAMIN B2)
POWDER (GRAM) MISCELLANEOUS
Qty: 1 | Refills: 0 | Status: DISCONTINUED | COMMUNITY
Start: 2017-02-07 | End: 2017-11-07

## 2017-11-07 RX ORDER — CALCIUM CARBONATE/VITAMIN D3 500 MG-10
500-400 TABLET,CHEWABLE ORAL TWICE DAILY
Qty: 180 | Refills: 3 | Status: DISCONTINUED | COMMUNITY
Start: 2017-05-26 | End: 2017-11-07

## 2017-11-13 ENCOUNTER — APPOINTMENT (OUTPATIENT)
Dept: RHEUMATOLOGY | Facility: CLINIC | Age: 52
End: 2017-11-13
Payer: MEDICAID

## 2017-11-13 VITALS
WEIGHT: 113 LBS | BODY MASS INDEX: 20.02 KG/M2 | SYSTOLIC BLOOD PRESSURE: 105 MMHG | HEART RATE: 67 BPM | TEMPERATURE: 98.1 F | OXYGEN SATURATION: 98 % | HEIGHT: 63 IN | DIASTOLIC BLOOD PRESSURE: 71 MMHG

## 2017-11-13 LAB
ALBUMIN SERPL ELPH-MCNC: 4.1 G/DL
ALP BLD-CCNC: 80 U/L
ALT SERPL-CCNC: 9 U/L
ANION GAP SERPL CALC-SCNC: 14 MMOL/L
APPEARANCE: CLEAR
AST SERPL-CCNC: 20 U/L
BACTERIA: NEGATIVE
BASOPHILS # BLD AUTO: 0.01 K/UL
BASOPHILS NFR BLD AUTO: 0.3 %
BILIRUB SERPL-MCNC: 0.5 MG/DL
BILIRUBIN URINE: NEGATIVE
BLOOD URINE: NEGATIVE
BUN SERPL-MCNC: 16 MG/DL
CALCIUM SERPL-MCNC: 9.5 MG/DL
CHLORIDE SERPL-SCNC: 105 MMOL/L
CHOLEST SERPL-MCNC: 184 MG/DL
CHOLEST/HDLC SERPL: 3.2 RATIO
CK SERPL-CCNC: 78 U/L
CO2 SERPL-SCNC: 23 MMOL/L
COLOR: ABNORMAL
CREAT SERPL-MCNC: 1.16 MG/DL
EOSINOPHIL # BLD AUTO: 0.05 K/UL
EOSINOPHIL NFR BLD AUTO: 1.4 %
GLUCOSE QUALITATIVE U: NEGATIVE MG/DL
GLUCOSE SERPL-MCNC: 82 MG/DL
HCT VFR BLD CALC: 36.1 %
HDLC SERPL-MCNC: 58 MG/DL
HGB BLD-MCNC: 11.3 G/DL
HYALINE CASTS: 0 /LPF
IMM GRANULOCYTES NFR BLD AUTO: 0 %
KETONES URINE: NEGATIVE
LDLC SERPL CALC-MCNC: 93 MG/DL
LEUKOCYTE ESTERASE URINE: NEGATIVE
LYMPHOCYTES # BLD AUTO: 1.38 K/UL
LYMPHOCYTES NFR BLD AUTO: 39.2 %
MAN DIFF?: NORMAL
MCHC RBC-ENTMCNC: 28 PG
MCHC RBC-ENTMCNC: 31.3 GM/DL
MCV RBC AUTO: 89.4 FL
MICROSCOPIC-UA: NORMAL
MONOCYTES # BLD AUTO: 0.42 K/UL
MONOCYTES NFR BLD AUTO: 11.9 %
NEUTROPHILS # BLD AUTO: 1.66 K/UL
NEUTROPHILS NFR BLD AUTO: 47.2 %
NITRITE URINE: NEGATIVE
PH URINE: 6
PLATELET # BLD AUTO: 235 K/UL
POTASSIUM SERPL-SCNC: 4.6 MMOL/L
PROT SERPL-MCNC: 7.2 G/DL
PROTEIN URINE: 100 MG/DL
RBC # BLD: 4.04 M/UL
RBC # FLD: 13.7 %
RED BLOOD CELLS URINE: 1 /HPF
SODIUM SERPL-SCNC: 142 MMOL/L
SPECIFIC GRAVITY URINE: 1.02
SQUAMOUS EPITHELIAL CELLS: 2 /HPF
TRIGL SERPL-MCNC: 164 MG/DL
UROBILINOGEN URINE: NEGATIVE MG/DL
WBC # FLD AUTO: 3.52 K/UL
WHITE BLOOD CELLS URINE: 1 /HPF

## 2017-11-13 PROCEDURE — 99214 OFFICE O/P EST MOD 30 MIN: CPT | Mod: 25

## 2017-11-13 PROCEDURE — 90686 IIV4 VACC NO PRSV 0.5 ML IM: CPT

## 2017-11-13 PROCEDURE — G0008: CPT

## 2017-11-14 DIAGNOSIS — G43.909 MIGRAINE, UNSPECIFIED, NOT INTRACTABLE, WITHOUT STATUS MIGRAINOSUS: ICD-10-CM

## 2017-11-14 LAB
25(OH)D3 SERPL-MCNC: 44.5 NG/ML
C3 SERPL-MCNC: 107 MG/DL
C4 SERPL-MCNC: 26 MG/DL
CREAT SPEC-SCNC: 124 MG/DL
CREAT/PROT UR: 0.5 RATIO
DSDNA AB SER-ACNC: 26 IU/ML
PROT UR-MCNC: 61 MG/DL
TSH SERPL-ACNC: 2.17 UIU/ML

## 2017-11-16 ENCOUNTER — OUTPATIENT (OUTPATIENT)
Dept: OUTPATIENT SERVICES | Facility: HOSPITAL | Age: 52
LOS: 1 days | End: 2017-11-16

## 2017-11-16 ENCOUNTER — APPOINTMENT (OUTPATIENT)
Dept: NUCLEAR MEDICINE | Facility: HOSPITAL | Age: 52
End: 2017-11-16
Payer: MEDICAID

## 2017-11-16 DIAGNOSIS — R10.12 LEFT UPPER QUADRANT PAIN: ICD-10-CM

## 2017-11-16 PROCEDURE — 78264 GASTRIC EMPTYING IMG STUDY: CPT | Mod: 26

## 2017-11-22 ENCOUNTER — OUTPATIENT (OUTPATIENT)
Dept: OUTPATIENT SERVICES | Facility: HOSPITAL | Age: 52
LOS: 1 days | End: 2017-11-22
Payer: MEDICAID

## 2017-11-22 ENCOUNTER — APPOINTMENT (OUTPATIENT)
Dept: INTERNAL MEDICINE | Facility: CLINIC | Age: 52
End: 2017-11-22
Payer: MEDICAID

## 2017-11-22 VITALS
HEART RATE: 76 BPM | DIASTOLIC BLOOD PRESSURE: 60 MMHG | HEIGHT: 63 IN | WEIGHT: 116 LBS | OXYGEN SATURATION: 98 % | BODY MASS INDEX: 20.55 KG/M2 | SYSTOLIC BLOOD PRESSURE: 122 MMHG

## 2017-11-22 DIAGNOSIS — I10 ESSENTIAL (PRIMARY) HYPERTENSION: ICD-10-CM

## 2017-11-22 DIAGNOSIS — G43.909 MIGRAINE, UNSPECIFIED, NOT INTRACTABLE, WITHOUT STATUS MIGRAINOSUS: ICD-10-CM

## 2017-11-22 DIAGNOSIS — M54.5 LOW BACK PAIN: ICD-10-CM

## 2017-11-22 DIAGNOSIS — M32.9 SYSTEMIC LUPUS ERYTHEMATOSUS, UNSPECIFIED: ICD-10-CM

## 2017-11-22 PROCEDURE — 99213 OFFICE O/P EST LOW 20 MIN: CPT | Mod: GE

## 2017-11-22 PROCEDURE — G0463: CPT

## 2017-11-28 ENCOUNTER — MEDICATION RENEWAL (OUTPATIENT)
Age: 52
End: 2017-11-28

## 2017-12-22 ENCOUNTER — RX RENEWAL (OUTPATIENT)
Age: 52
End: 2017-12-22

## 2018-01-29 ENCOUNTER — APPOINTMENT (OUTPATIENT)
Dept: RHEUMATOLOGY | Facility: CLINIC | Age: 53
End: 2018-01-29
Payer: MEDICAID

## 2018-01-29 ENCOUNTER — OTHER (OUTPATIENT)
Age: 53
End: 2018-01-29

## 2018-01-29 VITALS
TEMPERATURE: 98.2 F | WEIGHT: 110 LBS | HEIGHT: 60 IN | HEART RATE: 68 BPM | SYSTOLIC BLOOD PRESSURE: 97 MMHG | DIASTOLIC BLOOD PRESSURE: 66 MMHG | BODY MASS INDEX: 21.6 KG/M2 | OXYGEN SATURATION: 98 %

## 2018-01-29 PROCEDURE — 99214 OFFICE O/P EST MOD 30 MIN: CPT

## 2018-01-30 LAB
25(OH)D3 SERPL-MCNC: 36.9 NG/ML
ALBUMIN SERPL ELPH-MCNC: 4.1 G/DL
ALP BLD-CCNC: 89 U/L
ALT SERPL-CCNC: 14 U/L
ANION GAP SERPL CALC-SCNC: 13 MMOL/L
APPEARANCE: CLEAR
AST SERPL-CCNC: 19 U/L
BACTERIA: NEGATIVE
BASOPHILS # BLD AUTO: 0.02 K/UL
BASOPHILS NFR BLD AUTO: 0.4 %
BILIRUB SERPL-MCNC: 0.5 MG/DL
BILIRUBIN URINE: NEGATIVE
BLOOD URINE: NEGATIVE
BUN SERPL-MCNC: 16 MG/DL
C3 SERPL-MCNC: 99 MG/DL
C4 SERPL-MCNC: 22 MG/DL
CALCIUM SERPL-MCNC: 9.8 MG/DL
CHLORIDE SERPL-SCNC: 101 MMOL/L
CO2 SERPL-SCNC: 26 MMOL/L
COLOR: ABNORMAL
CREAT SERPL-MCNC: 1.09 MG/DL
CREAT SPEC-SCNC: 122 MG/DL
CREAT/PROT UR: 0.3 RATIO
DSDNA AB SER-ACNC: 14 IU/ML
EOSINOPHIL # BLD AUTO: 0.06 K/UL
EOSINOPHIL NFR BLD AUTO: 1.3 %
GLUCOSE QUALITATIVE U: NEGATIVE MG/DL
GLUCOSE SERPL-MCNC: 83 MG/DL
HCT VFR BLD CALC: 35.2 %
HGB BLD-MCNC: 11.3 G/DL
IMM GRANULOCYTES NFR BLD AUTO: 0 %
KETONES URINE: NEGATIVE
LEUKOCYTE ESTERASE URINE: NEGATIVE
LYMPHOCYTES # BLD AUTO: 1.93 K/UL
LYMPHOCYTES NFR BLD AUTO: 42 %
MAN DIFF?: NORMAL
MCHC RBC-ENTMCNC: 28.3 PG
MCHC RBC-ENTMCNC: 32.1 GM/DL
MCV RBC AUTO: 88.2 FL
MICROSCOPIC-UA: NORMAL
MONOCYTES # BLD AUTO: 0.42 K/UL
MONOCYTES NFR BLD AUTO: 9.2 %
NEUTROPHILS # BLD AUTO: 2.16 K/UL
NEUTROPHILS NFR BLD AUTO: 47.1 %
NITRITE URINE: NEGATIVE
PH URINE: 7.5
PLATELET # BLD AUTO: 249 K/UL
POTASSIUM SERPL-SCNC: 5 MMOL/L
PROT SERPL-MCNC: 7.2 G/DL
PROT UR-MCNC: 37 MG/DL
PROTEIN URINE: 30 MG/DL
RBC # BLD: 3.99 M/UL
RBC # FLD: 14.5 %
RED BLOOD CELLS URINE: 6 /HPF
SODIUM SERPL-SCNC: 140 MMOL/L
SPECIFIC GRAVITY URINE: 1.02
SQUAMOUS EPITHELIAL CELLS: 1 /HPF
TSH SERPL-ACNC: 2.39 UIU/ML
UROBILINOGEN URINE: NEGATIVE MG/DL
WBC # FLD AUTO: 4.59 K/UL
WHITE BLOOD CELLS URINE: 0 /HPF

## 2018-01-30 RX ORDER — TOBRAMYCIN / DEXAMETHASONE 3; .5 MG/ML; MG/ML
0.3-0.05 SUSPENSION/ DROPS OPHTHALMIC TWICE DAILY
Qty: 1 | Refills: 0 | Status: DISCONTINUED | COMMUNITY
Start: 2018-01-29 | End: 2018-01-30

## 2018-02-28 ENCOUNTER — RX RENEWAL (OUTPATIENT)
Age: 53
End: 2018-02-28

## 2018-03-06 ENCOUNTER — APPOINTMENT (OUTPATIENT)
Dept: NEUROLOGY | Facility: HOSPITAL | Age: 53
End: 2018-03-06

## 2018-03-06 ENCOUNTER — OUTPATIENT (OUTPATIENT)
Dept: OUTPATIENT SERVICES | Facility: HOSPITAL | Age: 53
LOS: 1 days | End: 2018-03-06
Payer: MEDICAID

## 2018-03-06 VITALS
BODY MASS INDEX: 21.79 KG/M2 | DIASTOLIC BLOOD PRESSURE: 70 MMHG | WEIGHT: 111 LBS | RESPIRATION RATE: 16 BRPM | SYSTOLIC BLOOD PRESSURE: 106 MMHG | HEART RATE: 63 BPM | HEIGHT: 60 IN

## 2018-03-06 DIAGNOSIS — R51 HEADACHE: ICD-10-CM

## 2018-03-06 DIAGNOSIS — G43.109 MIGRAINE WITH AURA, NOT INTRACTABLE, WITHOUT STATUS MIGRAINOSUS: ICD-10-CM

## 2018-03-06 PROCEDURE — G0463: CPT

## 2018-03-06 RX ORDER — ESTRADIOL 0.1 MG/G
0.1 CREAM VAGINAL
Qty: 1 | Refills: 5 | Status: DISCONTINUED | COMMUNITY
Start: 2017-09-14 | End: 2018-03-06

## 2018-03-06 RX ORDER — NEOMYCIN SULFATE, POLYMYXIN B SULFATE AND DEXAMETHASONE 3.5; 10000; 1 MG/ML; [USP'U]/ML; MG/ML
3.5-10000-0.1 SUSPENSION OPHTHALMIC 3 TIMES DAILY
Qty: 1 | Refills: 0 | Status: DISCONTINUED | COMMUNITY
Start: 2018-01-30 | End: 2018-03-06

## 2018-03-06 RX ORDER — POTASSIUM BICARB/MAG COMBO 21 500-300 MG
300-500 POWDER EFFERVESCENT (GRAM) ORAL DAILY
Qty: 30 | Refills: 2 | Status: DISCONTINUED | COMMUNITY
Start: 2017-08-01 | End: 2018-03-06

## 2018-03-06 RX ORDER — VALACYCLOVIR 1 G/1
1 TABLET, FILM COATED ORAL
Qty: 4 | Refills: 0 | Status: DISCONTINUED | COMMUNITY
Start: 2017-11-28 | End: 2018-03-06

## 2018-03-26 ENCOUNTER — OTHER (OUTPATIENT)
Age: 53
End: 2018-03-26

## 2018-03-28 ENCOUNTER — CHART COPY (OUTPATIENT)
Age: 53
End: 2018-03-28

## 2018-04-03 ENCOUNTER — OUTPATIENT (OUTPATIENT)
Dept: OUTPATIENT SERVICES | Facility: HOSPITAL | Age: 53
LOS: 1 days | End: 2018-04-03
Payer: MEDICAID

## 2018-04-03 ENCOUNTER — APPOINTMENT (OUTPATIENT)
Dept: GASTROENTEROLOGY | Facility: HOSPITAL | Age: 53
End: 2018-04-03
Payer: MEDICAID

## 2018-04-03 VITALS
HEIGHT: 60 IN | SYSTOLIC BLOOD PRESSURE: 116 MMHG | DIASTOLIC BLOOD PRESSURE: 80 MMHG | BODY MASS INDEX: 21.79 KG/M2 | RESPIRATION RATE: 14 BRPM | WEIGHT: 111 LBS | HEART RATE: 52 BPM

## 2018-04-03 DIAGNOSIS — R12 HEARTBURN: ICD-10-CM

## 2018-04-03 DIAGNOSIS — R10.9 UNSPECIFIED ABDOMINAL PAIN: ICD-10-CM

## 2018-04-03 DIAGNOSIS — R10.12 LEFT UPPER QUADRANT PAIN: ICD-10-CM

## 2018-04-03 DIAGNOSIS — K22.2 ESOPHAGEAL OBSTRUCTION: ICD-10-CM

## 2018-04-03 DIAGNOSIS — K21.9 GASTRO-ESOPHAGEAL REFLUX DISEASE WITHOUT ESOPHAGITIS: ICD-10-CM

## 2018-04-03 PROCEDURE — G0463: CPT

## 2018-04-03 PROCEDURE — 99213 OFFICE O/P EST LOW 20 MIN: CPT | Mod: GC

## 2018-04-09 ENCOUNTER — OTHER (OUTPATIENT)
Age: 53
End: 2018-04-09

## 2018-05-07 ENCOUNTER — APPOINTMENT (OUTPATIENT)
Dept: RHEUMATOLOGY | Facility: CLINIC | Age: 53
End: 2018-05-07
Payer: MEDICAID

## 2018-05-07 VITALS
WEIGHT: 109 LBS | HEART RATE: 65 BPM | BODY MASS INDEX: 21.4 KG/M2 | DIASTOLIC BLOOD PRESSURE: 74 MMHG | HEIGHT: 60 IN | OXYGEN SATURATION: 98 % | SYSTOLIC BLOOD PRESSURE: 106 MMHG | TEMPERATURE: 98.6 F

## 2018-05-07 LAB
ALBUMIN SERPL ELPH-MCNC: 4 G/DL
ALP BLD-CCNC: 80 U/L
ALT SERPL-CCNC: 9 U/L
ANION GAP SERPL CALC-SCNC: 15 MMOL/L
APPEARANCE: CLEAR
AST SERPL-CCNC: 18 U/L
BACTERIA: NEGATIVE
BASOPHILS # BLD AUTO: 0.01 K/UL
BASOPHILS NFR BLD AUTO: 0.2 %
BILIRUB SERPL-MCNC: 0.5 MG/DL
BILIRUBIN URINE: NEGATIVE
BLOOD URINE: NEGATIVE
BUN SERPL-MCNC: 17 MG/DL
CALCIUM SERPL-MCNC: 9.2 MG/DL
CHLORIDE SERPL-SCNC: 108 MMOL/L
CHOLEST SERPL-MCNC: 167 MG/DL
CHOLEST/HDLC SERPL: 2.9 RATIO
CK SERPL-CCNC: 64 U/L
CO2 SERPL-SCNC: 22 MMOL/L
COLOR: ABNORMAL
CREAT SERPL-MCNC: 1.01 MG/DL
CREAT SPEC-SCNC: 64 MG/DL
CREAT/PROT UR: 0.6 RATIO
EOSINOPHIL # BLD AUTO: 0.03 K/UL
EOSINOPHIL NFR BLD AUTO: 0.6 %
GLUCOSE QUALITATIVE U: NEGATIVE MG/DL
GLUCOSE SERPL-MCNC: 86 MG/DL
HCT VFR BLD CALC: 35.9 %
HDLC SERPL-MCNC: 57 MG/DL
HGB BLD-MCNC: 11.2 G/DL
IMM GRANULOCYTES NFR BLD AUTO: 0 %
KETONES URINE: NEGATIVE
LDLC SERPL CALC-MCNC: 72 MG/DL
LEUKOCYTE ESTERASE URINE: NEGATIVE
LYMPHOCYTES # BLD AUTO: 1.45 K/UL
LYMPHOCYTES NFR BLD AUTO: 30.7 %
MAN DIFF?: NORMAL
MCHC RBC-ENTMCNC: 28.4 PG
MCHC RBC-ENTMCNC: 31.2 GM/DL
MCV RBC AUTO: 91.1 FL
MICROSCOPIC-UA: NORMAL
MONOCYTES # BLD AUTO: 0.55 K/UL
MONOCYTES NFR BLD AUTO: 11.6 %
NEUTROPHILS # BLD AUTO: 2.69 K/UL
NEUTROPHILS NFR BLD AUTO: 56.9 %
NITRITE URINE: NEGATIVE
PH URINE: 6
PLATELET # BLD AUTO: 217 K/UL
POTASSIUM SERPL-SCNC: 4.5 MMOL/L
PROT SERPL-MCNC: 7.1 G/DL
PROT UR-MCNC: 36 MG/DL
PROTEIN URINE: 30 MG/DL
RBC # BLD: 3.94 M/UL
RBC # FLD: 14 %
RED BLOOD CELLS URINE: 0 /HPF
SODIUM SERPL-SCNC: 145 MMOL/L
SPECIFIC GRAVITY URINE: 1.01
SQUAMOUS EPITHELIAL CELLS: 0 /HPF
TRIGL SERPL-MCNC: 190 MG/DL
UROBILINOGEN URINE: NEGATIVE MG/DL
WBC # FLD AUTO: 4.73 K/UL
WHITE BLOOD CELLS URINE: 0 /HPF

## 2018-05-07 PROCEDURE — 99214 OFFICE O/P EST MOD 30 MIN: CPT

## 2018-05-08 LAB
25(OH)D3 SERPL-MCNC: 49.1 NG/ML
C3 SERPL-MCNC: 95 MG/DL
C4 SERPL-MCNC: 24 MG/DL
TSH SERPL-ACNC: 3.71 UIU/ML

## 2018-05-21 ENCOUNTER — RX RENEWAL (OUTPATIENT)
Age: 53
End: 2018-05-21

## 2018-06-01 PROCEDURE — G9005: CPT

## 2018-06-27 ENCOUNTER — RESULT REVIEW (OUTPATIENT)
Age: 53
End: 2018-06-27

## 2018-06-27 ENCOUNTER — OUTPATIENT (OUTPATIENT)
Dept: OUTPATIENT SERVICES | Facility: HOSPITAL | Age: 53
LOS: 1 days | End: 2018-06-27
Payer: MEDICAID

## 2018-06-27 DIAGNOSIS — K21.9 GASTRO-ESOPHAGEAL REFLUX DISEASE WITHOUT ESOPHAGITIS: ICD-10-CM

## 2018-06-27 DIAGNOSIS — R12 HEARTBURN: ICD-10-CM

## 2018-06-27 PROCEDURE — 88312 SPECIAL STAINS GROUP 1: CPT

## 2018-06-27 PROCEDURE — 88305 TISSUE EXAM BY PATHOLOGIST: CPT | Mod: 26

## 2018-06-27 PROCEDURE — 43239 EGD BIOPSY SINGLE/MULTIPLE: CPT

## 2018-06-27 PROCEDURE — C1889: CPT

## 2018-06-27 PROCEDURE — 88312 SPECIAL STAINS GROUP 1: CPT | Mod: 26

## 2018-06-27 PROCEDURE — 88305 TISSUE EXAM BY PATHOLOGIST: CPT

## 2018-07-01 ENCOUNTER — OUTPATIENT (OUTPATIENT)
Dept: OUTPATIENT SERVICES | Facility: HOSPITAL | Age: 53
LOS: 1 days | End: 2018-07-01
Payer: MEDICAID

## 2018-07-02 ENCOUNTER — APPOINTMENT (OUTPATIENT)
Dept: RHEUMATOLOGY | Facility: CLINIC | Age: 53
End: 2018-07-02
Payer: MEDICAID

## 2018-07-02 VITALS
OXYGEN SATURATION: 98 % | SYSTOLIC BLOOD PRESSURE: 106 MMHG | TEMPERATURE: 98.1 F | HEIGHT: 60 IN | DIASTOLIC BLOOD PRESSURE: 69 MMHG | HEART RATE: 69 BPM

## 2018-07-02 LAB
25(OH)D3 SERPL-MCNC: 50.5 NG/ML
ALBUMIN SERPL ELPH-MCNC: 4 G/DL
ALP BLD-CCNC: 84 U/L
ALT SERPL-CCNC: 15 U/L
ANION GAP SERPL CALC-SCNC: 13 MMOL/L
APPEARANCE: CLEAR
AST SERPL-CCNC: 22 U/L
BACTERIA: NEGATIVE
BASOPHILS # BLD AUTO: 0.01 K/UL
BASOPHILS NFR BLD AUTO: 0.3 %
BILIRUB SERPL-MCNC: 0.5 MG/DL
BILIRUBIN URINE: NEGATIVE
BLOOD URINE: NEGATIVE
BUN SERPL-MCNC: 26 MG/DL
C3 SERPL-MCNC: 110 MG/DL
C4 SERPL-MCNC: 25 MG/DL
CALCIUM SERPL-MCNC: 9.5 MG/DL
CHLORIDE SERPL-SCNC: 106 MMOL/L
CHOLEST SERPL-MCNC: 184 MG/DL
CHOLEST/HDLC SERPL: 2.7 RATIO
CK SERPL-CCNC: 43 U/L
CO2 SERPL-SCNC: 23 MMOL/L
COLOR: YELLOW
CREAT SERPL-MCNC: 1.21 MG/DL
EOSINOPHIL # BLD AUTO: 0.05 K/UL
EOSINOPHIL NFR BLD AUTO: 1.6 %
GLUCOSE QUALITATIVE U: NEGATIVE MG/DL
GLUCOSE SERPL-MCNC: 95 MG/DL
HCT VFR BLD CALC: 34.8 %
HDLC SERPL-MCNC: 68 MG/DL
HGB BLD-MCNC: 11.5 G/DL
HYALINE CASTS: 1 /LPF
IMM GRANULOCYTES NFR BLD AUTO: 0 %
KETONES URINE: NEGATIVE
LDLC SERPL CALC-MCNC: 90 MG/DL
LEUKOCYTE ESTERASE URINE: NEGATIVE
LYMPHOCYTES # BLD AUTO: 1.2 K/UL
LYMPHOCYTES NFR BLD AUTO: 37.9 %
MAN DIFF?: NORMAL
MCHC RBC-ENTMCNC: 29.5 PG
MCHC RBC-ENTMCNC: 33 GM/DL
MCV RBC AUTO: 89.2 FL
MICROSCOPIC-UA: NORMAL
MONOCYTES # BLD AUTO: 0.37 K/UL
MONOCYTES NFR BLD AUTO: 11.7 %
NEUTROPHILS # BLD AUTO: 1.54 K/UL
NEUTROPHILS NFR BLD AUTO: 48.5 %
NITRITE URINE: NEGATIVE
PH URINE: 5.5
PLATELET # BLD AUTO: 219 K/UL
POTASSIUM SERPL-SCNC: 4.8 MMOL/L
PROT SERPL-MCNC: 6.9 G/DL
PROTEIN URINE: 100 MG/DL
RBC # BLD: 3.9 M/UL
RBC # FLD: 14.1 %
RED BLOOD CELLS URINE: 4 /HPF
SODIUM SERPL-SCNC: 142 MMOL/L
SPECIFIC GRAVITY URINE: 1.02
SQUAMOUS EPITHELIAL CELLS: 0 /HPF
TRIGL SERPL-MCNC: 130 MG/DL
UROBILINOGEN URINE: NEGATIVE MG/DL
WBC # FLD AUTO: 3.17 K/UL
WHITE BLOOD CELLS URINE: 0 /HPF

## 2018-07-02 PROCEDURE — 99214 OFFICE O/P EST MOD 30 MIN: CPT

## 2018-07-03 LAB
CREAT SPEC-SCNC: 112 MG/DL
CREAT/PROT UR: 0.4 RATIO
DSDNA AB SER-ACNC: 13 IU/ML
ENA RNP AB SER IA-ACNC: 3.3 AL
ENA SM AB SER IA-ACNC: 0.2 AL
ENA SS-A AB SER IA-ACNC: <0.2 AL
ENA SS-B AB SER IA-ACNC: <0.2 AL
PROT UR-MCNC: 50 MG/DL
TSH SERPL-ACNC: 3.43 UIU/ML

## 2018-07-18 DIAGNOSIS — Z71.89 OTHER SPECIFIED COUNSELING: ICD-10-CM

## 2018-07-19 PROBLEM — F32.9 MAJOR DEPRESSIVE DISORDER, SINGLE EPISODE, UNSPECIFIED: Chronic | Status: ACTIVE | Noted: 2017-07-29

## 2018-07-19 PROBLEM — G43.909 MIGRAINE, UNSPECIFIED, NOT INTRACTABLE, WITHOUT STATUS MIGRAINOSUS: Chronic | Status: ACTIVE | Noted: 2017-07-29

## 2018-07-22 PROBLEM — Z80.0 FAMILY HISTORY OF COLON CANCER: Status: INACTIVE | Noted: 2017-11-07

## 2018-07-24 ENCOUNTER — OUTPATIENT (OUTPATIENT)
Dept: OUTPATIENT SERVICES | Facility: HOSPITAL | Age: 53
LOS: 1 days | End: 2018-07-24
Payer: MEDICAID

## 2018-07-24 ENCOUNTER — APPOINTMENT (OUTPATIENT)
Dept: GASTROENTEROLOGY | Facility: HOSPITAL | Age: 53
End: 2018-07-24
Payer: MEDICAID

## 2018-07-24 VITALS
HEIGHT: 60 IN | DIASTOLIC BLOOD PRESSURE: 82 MMHG | HEART RATE: 62 BPM | BODY MASS INDEX: 21.6 KG/M2 | SYSTOLIC BLOOD PRESSURE: 112 MMHG | RESPIRATION RATE: 14 BRPM | WEIGHT: 110 LBS

## 2018-07-24 DIAGNOSIS — R19.7 DIARRHEA, UNSPECIFIED: ICD-10-CM

## 2018-07-24 DIAGNOSIS — R10.9 UNSPECIFIED ABDOMINAL PAIN: ICD-10-CM

## 2018-07-24 DIAGNOSIS — K21.9 GASTRO-ESOPHAGEAL REFLUX DISEASE WITHOUT ESOPHAGITIS: ICD-10-CM

## 2018-07-24 DIAGNOSIS — K31.9 DISEASE OF STOMACH AND DUODENUM, UNSPECIFIED: ICD-10-CM

## 2018-07-24 DIAGNOSIS — K22.2 ESOPHAGEAL OBSTRUCTION: ICD-10-CM

## 2018-07-24 LAB
CULTURE RESULTS: SIGNIFICANT CHANGE UP
SPECIMEN SOURCE: SIGNIFICANT CHANGE UP

## 2018-07-24 PROCEDURE — 87507 IADNA-DNA/RNA PROBE TQ 12-25: CPT

## 2018-07-24 PROCEDURE — 99214 OFFICE O/P EST MOD 30 MIN: CPT | Mod: GC

## 2018-07-24 PROCEDURE — G0463: CPT

## 2018-07-30 ENCOUNTER — OUTPATIENT (OUTPATIENT)
Dept: OUTPATIENT SERVICES | Facility: HOSPITAL | Age: 53
LOS: 1 days | End: 2018-07-30
Payer: MEDICAID

## 2018-07-30 ENCOUNTER — APPOINTMENT (OUTPATIENT)
Dept: ULTRASOUND IMAGING | Facility: CLINIC | Age: 53
End: 2018-07-30
Payer: MEDICAID

## 2018-07-30 DIAGNOSIS — R10.9 UNSPECIFIED ABDOMINAL PAIN: ICD-10-CM

## 2018-07-30 PROCEDURE — 76700 US EXAM ABDOM COMPLETE: CPT

## 2018-07-30 PROCEDURE — 76700 US EXAM ABDOM COMPLETE: CPT | Mod: 26

## 2018-08-14 ENCOUNTER — RX RENEWAL (OUTPATIENT)
Age: 53
End: 2018-08-14

## 2018-08-30 ENCOUNTER — RX RENEWAL (OUTPATIENT)
Age: 53
End: 2018-08-30

## 2018-09-04 ENCOUNTER — OUTPATIENT (OUTPATIENT)
Dept: OUTPATIENT SERVICES | Facility: HOSPITAL | Age: 53
LOS: 1 days | End: 2018-09-04
Payer: MEDICAID

## 2018-09-04 ENCOUNTER — APPOINTMENT (OUTPATIENT)
Dept: INTERNAL MEDICINE | Facility: CLINIC | Age: 53
End: 2018-09-04
Payer: MEDICAID

## 2018-09-04 ENCOUNTER — LABORATORY RESULT (OUTPATIENT)
Age: 53
End: 2018-09-04

## 2018-09-04 VITALS
WEIGHT: 109 LBS | SYSTOLIC BLOOD PRESSURE: 90 MMHG | BODY MASS INDEX: 21.4 KG/M2 | DIASTOLIC BLOOD PRESSURE: 68 MMHG | HEIGHT: 60 IN

## 2018-09-04 DIAGNOSIS — I10 ESSENTIAL (PRIMARY) HYPERTENSION: ICD-10-CM

## 2018-09-04 PROCEDURE — 99213 OFFICE O/P EST LOW 20 MIN: CPT | Mod: GE

## 2018-09-04 PROCEDURE — 86480 TB TEST CELL IMMUN MEASURE: CPT

## 2018-09-04 PROCEDURE — G0463: CPT

## 2018-09-04 RX ORDER — CLONAZEPAM 1 MG/1
1 TABLET ORAL AT BEDTIME
Refills: 0 | Status: DISCONTINUED | COMMUNITY
Start: 2017-08-04 | End: 2018-09-04

## 2018-09-06 LAB
GAMMA INTERFERON BACKGROUND BLD IA-ACNC: 0.13 IU/ML — SIGNIFICANT CHANGE UP
M TB IFN-G BLD-IMP: NEGATIVE — SIGNIFICANT CHANGE UP
M TB IFN-G CD4+ BCKGRND COR BLD-ACNC: 0.02 IU/ML — SIGNIFICANT CHANGE UP
M TB IFN-G CD4+CD8+ BCKGRND COR BLD-ACNC: 0.03 IU/ML — SIGNIFICANT CHANGE UP
QUANT TB PLUS MITOGEN MINUS NIL: >10 IU/ML — SIGNIFICANT CHANGE UP

## 2018-09-10 ENCOUNTER — MOBILE ON CALL (OUTPATIENT)
Age: 53
End: 2018-09-10

## 2018-09-12 ENCOUNTER — RX RENEWAL (OUTPATIENT)
Age: 53
End: 2018-09-12

## 2018-09-17 ENCOUNTER — APPOINTMENT (OUTPATIENT)
Dept: RHEUMATOLOGY | Facility: CLINIC | Age: 53
End: 2018-09-17

## 2018-09-18 DIAGNOSIS — M32.9 SYSTEMIC LUPUS ERYTHEMATOSUS, UNSPECIFIED: ICD-10-CM

## 2018-09-18 DIAGNOSIS — E03.9 HYPOTHYROIDISM, UNSPECIFIED: ICD-10-CM

## 2018-09-18 DIAGNOSIS — J06.9 ACUTE UPPER RESPIRATORY INFECTION, UNSPECIFIED: ICD-10-CM

## 2018-09-18 DIAGNOSIS — F32.9 MAJOR DEPRESSIVE DISORDER, SINGLE EPISODE, UNSPECIFIED: ICD-10-CM

## 2018-09-18 NOTE — PHYSICAL EXAM
[No Acute Distress] : no acute distress [Well Nourished] : well nourished [Normal Voice/Communication] : normal voice/communication [Normal Sclera/Conjunctiva] : normal sclera/conjunctiva [PERRL] : pupils equal round and reactive to light [Normal Outer Ear/Nose] : the outer ears and nose were normal in appearance [Normal Oropharynx] : the oropharynx was normal [No JVD] : no jugular venous distention [Supple] : supple [No Lymphadenopathy] : no lymphadenopathy [No Respiratory Distress] : no respiratory distress  [Clear to Auscultation] : lungs were clear to auscultation bilaterally [No Accessory Muscle Use] : no accessory muscle use [Normal Rate] : normal rate  [Regular Rhythm] : with a regular rhythm [Normal S1, S2] : normal S1 and S2 [No Carotid Bruits] : no carotid bruits [No Abdominal Bruit] : a ~M bruit was not heard ~T in the abdomen [No Varicosities] : no varicosities [No Edema] : there was no peripheral edema [No Extremity Clubbing/Cyanosis] : no extremity clubbing/cyanosis [No Palpable Aorta] : no palpable aorta [Soft] : abdomen soft [Non Tender] : non-tender [Non-distended] : non-distended [No HSM] : no HSM [Normal Bowel Sounds] : normal bowel sounds [No CVA Tenderness] : no CVA  tenderness [No Spinal Tenderness] : no spinal tenderness [No Joint Swelling] : no joint swelling [No Rash] : no rash [No Skin Lesions] : no skin lesions [Normal Gait] : normal gait [Coordination Grossly Intact] : coordination grossly intact [No Focal Deficits] : no focal deficits [Speech Grossly Normal] : speech grossly normal [Memory Grossly Normal] : memory grossly normal [Normal Mood] : the mood was normal [de-identified] : no postnasal drip [de-identified] : +wheezing

## 2018-09-18 NOTE — REVIEW OF SYSTEMS
[Fever] : fever [Chills] : chills [Shortness Of Breath] : shortness of breath [Wheezing] : wheezing [Cough] : cough [Joint Pain] : joint pain [Joint Swelling] : joint swelling [Night Sweats] : no night sweats [Recent Change In Weight] : ~T no recent weight change [Discharge] : no discharge [Pain] : no pain [Vision Problems] : no vision problems [Itching] : no itching [Earache] : no earache [Hearing Loss] : no hearing loss [Nasal Discharge] : no nasal discharge [Sore Throat] : no sore throat [Chest Pain] : no chest pain [Palpitations] : no palpitations [Orthopnea] : no orthopnea [Paroysmal Nocturnal Dyspnea] : no paroysmal nocturnal dyspnea [Abdominal Pain] : no abdominal pain [Nausea] : no nausea [Constipation] : no constipation [Vomiting] : no vomiting [Heartburn] : no heartburn [Melena] : no melena [Dysuria] : no dysuria [Incontinence] : no incontinence [Nocturia] : no nocturia [Hematuria] : no hematuria [Vaginal Discharge] : no vaginal discharge [Joint Stiffness] : no joint stiffness [Muscle Pain] : no muscle pain [Back Pain] : no back pain [Mole Changes] : no mole changes [Skin Rash] : no skin rash [Headache] : no headache [Dizziness] : no dizziness [Memory Loss] : no memory loss [Unsteady Walking] : no ataxia [Suicidal] : not suicidal [Insomnia] : no insomnia

## 2018-09-18 NOTE — ASSESSMENT
[FreeTextEntry1] : 53F PMH SLE (on cellcept), lupus nephritis, anxiety, depression, hypothyroidism presents for CPE, c/o cough x 1 week.\par \par # cough - patient has had productive cough for 1 week that has not improved.  Reports subjective fevers, currently afebrile.  Given symptoms not improving and patient immunosuppressed w/ significant coughing while in office, will treat for bacterial PNA w/ azithromycin.  Patient also given alubterol and rx atrovent nasal spray for symptomatic relief.  Patient told if develops shortness of breath or worsened symptoms to call clinic or go to the ED.  Patient expressed understanding.\par \par # SLE - c/w cellcept 500 mg BID.  f/u w/ Dr. Billingsley (has appt 9/17)\par \par # SLE nephritis - last CMP 7/2018 w/ creatinine at baseline 1.2\par \par # Depression - c/w sertraline and olanzipine per psych\par \par # hypothyroidism - c/w levothyroxine 50 mg - TSH 7/18 wnl.  \par \par # HCM - cancer screening - due for mammo - given rx.  Colonoscopy normal in 2014, due in 2024.  Pap w/ HPV wnl in 2016, due 2021.  \par - vaccines - pcv 2015, had prevnar in July w/ rheum. \par - will check quant gold as patient requesting for adult day program.\par \par RTC in 6 months or sooner PRN.

## 2018-09-18 NOTE — HISTORY OF PRESENT ILLNESS
[FreeTextEntry1] : cough [de-identified] : 53F PMH SLE (on cellcept), lupus nephritis, anxiety, depression, hypothyroidism presents for CPE.  Patient reports 1 week of productive cough.  Reports subjective fevers atlhough didn't check her temperature.  +SOB when she has a cough.  No sick contacts or recent travel.  +myalgias.\par \par For her SLE she takes cellcept 500 mg BID, she follows up with Dr. Billingsley.  Her SLE is controlled, has minimal joint pains.  Her creatinine in July 2018 was 1.2, at her baseline.\par For her depression/anxiety, she follows with a psychiatrist, takes sertraline and olanzapine, prescribed by her psychiatrist.\par For her hypothyroidism she takes synthroid 50 mg daily.  TSH was checked in July 2018 and was wnl.

## 2018-09-25 ENCOUNTER — OUTPATIENT (OUTPATIENT)
Dept: OUTPATIENT SERVICES | Facility: HOSPITAL | Age: 53
LOS: 1 days | End: 2018-09-25
Payer: MEDICAID

## 2018-09-25 ENCOUNTER — APPOINTMENT (OUTPATIENT)
Dept: NEUROLOGY | Facility: HOSPITAL | Age: 53
End: 2018-09-25
Payer: MEDICAID

## 2018-09-25 ENCOUNTER — APPOINTMENT (OUTPATIENT)
Dept: GASTROENTEROLOGY | Facility: HOSPITAL | Age: 53
End: 2018-09-25
Payer: MEDICAID

## 2018-09-25 VITALS
DIASTOLIC BLOOD PRESSURE: 73 MMHG | HEART RATE: 61 BPM | SYSTOLIC BLOOD PRESSURE: 113 MMHG | HEIGHT: 64 IN | BODY MASS INDEX: 18.44 KG/M2 | WEIGHT: 108 LBS | RESPIRATION RATE: 16 BRPM

## 2018-09-25 VITALS
WEIGHT: 108 LBS | BODY MASS INDEX: 18.44 KG/M2 | SYSTOLIC BLOOD PRESSURE: 113 MMHG | HEIGHT: 64 IN | HEART RATE: 61 BPM | DIASTOLIC BLOOD PRESSURE: 73 MMHG

## 2018-09-25 DIAGNOSIS — G43.109 MIGRAINE WITH AURA, NOT INTRACTABLE, WITHOUT STATUS MIGRAINOSUS: ICD-10-CM

## 2018-09-25 DIAGNOSIS — R56.9 UNSPECIFIED CONVULSIONS: ICD-10-CM

## 2018-09-25 DIAGNOSIS — H57.09 OTHER ANOMALIES OF PUPILLARY FUNCTION: ICD-10-CM

## 2018-09-25 DIAGNOSIS — K31.9 DISEASE OF STOMACH AND DUODENUM, UNSPECIFIED: ICD-10-CM

## 2018-09-25 PROCEDURE — G0463: CPT

## 2018-09-25 PROCEDURE — 99213 OFFICE O/P EST LOW 20 MIN: CPT | Mod: GC

## 2018-09-25 RX ORDER — IPRATROPIUM BROMIDE 42 UG/1
0.06 SPRAY NASAL 3 TIMES DAILY
Qty: 1 | Refills: 0 | Status: DISCONTINUED | COMMUNITY
Start: 2018-09-04 | End: 2018-09-25

## 2018-09-25 RX ORDER — AZITHROMYCIN 250 MG/1
250 TABLET, FILM COATED ORAL
Qty: 1 | Refills: 0 | Status: DISCONTINUED | COMMUNITY
Start: 2018-09-04 | End: 2018-09-25

## 2018-09-25 RX ORDER — ZONISAMIDE 25 MG/1
25 CAPSULE ORAL
Qty: 30 | Refills: 2 | Status: DISCONTINUED | COMMUNITY
Start: 2018-03-06 | End: 2018-09-25

## 2018-09-26 DIAGNOSIS — R10.2 PELVIC AND PERINEAL PAIN: ICD-10-CM

## 2018-09-26 DIAGNOSIS — R10.9 UNSPECIFIED ABDOMINAL PAIN: ICD-10-CM

## 2018-10-04 ENCOUNTER — FORM ENCOUNTER (OUTPATIENT)
Age: 53
End: 2018-10-04

## 2018-10-05 ENCOUNTER — APPOINTMENT (OUTPATIENT)
Dept: MRI IMAGING | Facility: CLINIC | Age: 53
End: 2018-10-05
Payer: MEDICAID

## 2018-10-05 ENCOUNTER — APPOINTMENT (OUTPATIENT)
Dept: ULTRASOUND IMAGING | Facility: CLINIC | Age: 53
End: 2018-10-05
Payer: MEDICAID

## 2018-10-05 ENCOUNTER — OUTPATIENT (OUTPATIENT)
Dept: OUTPATIENT SERVICES | Facility: HOSPITAL | Age: 53
LOS: 1 days | End: 2018-10-05
Payer: MEDICAID

## 2018-10-05 DIAGNOSIS — H57.09 OTHER ANOMALIES OF PUPILLARY FUNCTION: ICD-10-CM

## 2018-10-05 PROCEDURE — 70553 MRI BRAIN STEM W/O & W/DYE: CPT | Mod: 26

## 2018-10-05 PROCEDURE — 70543 MRI ORBT/FAC/NCK W/O &W/DYE: CPT | Mod: 26

## 2018-10-05 PROCEDURE — 76856 US EXAM PELVIC COMPLETE: CPT | Mod: 26

## 2018-10-05 PROCEDURE — 70553 MRI BRAIN STEM W/O & W/DYE: CPT

## 2018-10-05 PROCEDURE — 70543 MRI ORBT/FAC/NCK W/O &W/DYE: CPT

## 2018-10-05 PROCEDURE — 82565 ASSAY OF CREATININE: CPT

## 2018-10-05 PROCEDURE — A9585: CPT

## 2018-10-05 PROCEDURE — 76856 US EXAM PELVIC COMPLETE: CPT

## 2018-10-06 ENCOUNTER — EMERGENCY (EMERGENCY)
Facility: HOSPITAL | Age: 53
LOS: 1 days | Discharge: HOME | End: 2018-10-06
Attending: EMERGENCY MEDICINE
Payer: MEDICAID

## 2018-10-06 VITALS
OXYGEN SATURATION: 100 % | TEMPERATURE: 98 F | SYSTOLIC BLOOD PRESSURE: 98 MMHG | DIASTOLIC BLOOD PRESSURE: 52 MMHG | HEART RATE: 60 BPM | RESPIRATION RATE: 18 BRPM

## 2018-10-06 VITALS
HEART RATE: 57 BPM | DIASTOLIC BLOOD PRESSURE: 76 MMHG | OXYGEN SATURATION: 100 % | WEIGHT: 108.03 LBS | TEMPERATURE: 98 F | RESPIRATION RATE: 16 BRPM | SYSTOLIC BLOOD PRESSURE: 115 MMHG | HEIGHT: 63 IN

## 2018-10-06 LAB
ALBUMIN SERPL ELPH-MCNC: 4.1 G/DL — SIGNIFICANT CHANGE UP (ref 3.3–5)
ALP SERPL-CCNC: 87 U/L — SIGNIFICANT CHANGE UP (ref 40–120)
ALT FLD-CCNC: 8 U/L — LOW (ref 10–45)
ANION GAP SERPL CALC-SCNC: 12 MMOL/L — SIGNIFICANT CHANGE UP (ref 5–17)
APTT BLD: 36.6 SEC — SIGNIFICANT CHANGE UP (ref 27.5–37.4)
AST SERPL-CCNC: 17 U/L — SIGNIFICANT CHANGE UP (ref 10–40)
BASOPHILS # BLD AUTO: 0 K/UL — SIGNIFICANT CHANGE UP (ref 0–0.2)
BASOPHILS NFR BLD AUTO: 1.1 % — SIGNIFICANT CHANGE UP (ref 0–2)
BILIRUB SERPL-MCNC: 0.6 MG/DL — SIGNIFICANT CHANGE UP (ref 0.2–1.2)
BUN SERPL-MCNC: 15 MG/DL — SIGNIFICANT CHANGE UP (ref 7–23)
CALCIUM SERPL-MCNC: 9.6 MG/DL — SIGNIFICANT CHANGE UP (ref 8.4–10.5)
CHLORIDE SERPL-SCNC: 97 MMOL/L — SIGNIFICANT CHANGE UP (ref 96–108)
CO2 SERPL-SCNC: 23 MMOL/L — SIGNIFICANT CHANGE UP (ref 22–31)
CREAT SERPL-MCNC: 0.88 MG/DL — SIGNIFICANT CHANGE UP (ref 0.5–1.3)
EOSINOPHIL # BLD AUTO: 0 K/UL — SIGNIFICANT CHANGE UP (ref 0–0.5)
EOSINOPHIL NFR BLD AUTO: 1 % — SIGNIFICANT CHANGE UP (ref 0–6)
GLUCOSE SERPL-MCNC: 103 MG/DL — HIGH (ref 70–99)
HCT VFR BLD CALC: 37.4 % — SIGNIFICANT CHANGE UP (ref 34.5–45)
HGB BLD-MCNC: 12 G/DL — SIGNIFICANT CHANGE UP (ref 11.5–15.5)
INR BLD: 1.05 RATIO — SIGNIFICANT CHANGE UP (ref 0.88–1.16)
LYMPHOCYTES # BLD AUTO: 1.1 K/UL — SIGNIFICANT CHANGE UP (ref 1–3.3)
LYMPHOCYTES # BLD AUTO: 25.4 % — SIGNIFICANT CHANGE UP (ref 13–44)
MCHC RBC-ENTMCNC: 28.4 PG — SIGNIFICANT CHANGE UP (ref 27–34)
MCHC RBC-ENTMCNC: 32 GM/DL — SIGNIFICANT CHANGE UP (ref 32–36)
MCV RBC AUTO: 88.6 FL — SIGNIFICANT CHANGE UP (ref 80–100)
MONOCYTES # BLD AUTO: 0.2 K/UL — SIGNIFICANT CHANGE UP (ref 0–0.9)
MONOCYTES NFR BLD AUTO: 5.6 % — SIGNIFICANT CHANGE UP (ref 2–14)
NEUTROPHILS # BLD AUTO: 2.9 K/UL — SIGNIFICANT CHANGE UP (ref 1.8–7.4)
NEUTROPHILS NFR BLD AUTO: 66.9 % — SIGNIFICANT CHANGE UP (ref 43–77)
PLATELET # BLD AUTO: 222 K/UL — SIGNIFICANT CHANGE UP (ref 150–400)
POTASSIUM SERPL-MCNC: 4.1 MMOL/L — SIGNIFICANT CHANGE UP (ref 3.5–5.3)
POTASSIUM SERPL-SCNC: 4.1 MMOL/L — SIGNIFICANT CHANGE UP (ref 3.5–5.3)
PROT SERPL-MCNC: 7.6 G/DL — SIGNIFICANT CHANGE UP (ref 6–8.3)
PROTHROM AB SERPL-ACNC: 11.4 SEC — SIGNIFICANT CHANGE UP (ref 9.8–12.7)
RBC # BLD: 4.23 M/UL — SIGNIFICANT CHANGE UP (ref 3.8–5.2)
RBC # FLD: 13 % — SIGNIFICANT CHANGE UP (ref 10.3–14.5)
SODIUM SERPL-SCNC: 132 MMOL/L — LOW (ref 135–145)
WBC # BLD: 4.3 K/UL — SIGNIFICANT CHANGE UP (ref 3.8–10.5)
WBC # FLD AUTO: 4.3 K/UL — SIGNIFICANT CHANGE UP (ref 3.8–10.5)

## 2018-10-06 PROCEDURE — 85610 PROTHROMBIN TIME: CPT

## 2018-10-06 PROCEDURE — 99284 EMERGENCY DEPT VISIT MOD MDM: CPT | Mod: 25

## 2018-10-06 PROCEDURE — 80053 COMPREHEN METABOLIC PANEL: CPT

## 2018-10-06 PROCEDURE — 85027 COMPLETE CBC AUTOMATED: CPT

## 2018-10-06 PROCEDURE — 96374 THER/PROPH/DIAG INJ IV PUSH: CPT

## 2018-10-06 PROCEDURE — 85730 THROMBOPLASTIN TIME PARTIAL: CPT

## 2018-10-06 RX ORDER — ACETAMINOPHEN 500 MG
975 TABLET ORAL ONCE
Qty: 0 | Refills: 0 | Status: DISCONTINUED | OUTPATIENT
Start: 2018-10-06 | End: 2018-10-10

## 2018-10-06 RX ORDER — MAGNESIUM SULFATE 500 MG/ML
2 VIAL (ML) INJECTION ONCE
Qty: 0 | Refills: 0 | Status: COMPLETED | OUTPATIENT
Start: 2018-10-06 | End: 2018-10-06

## 2018-10-06 RX ORDER — DIPHENHYDRAMINE HCL 50 MG
25 CAPSULE ORAL ONCE
Qty: 0 | Refills: 0 | Status: DISCONTINUED | OUTPATIENT
Start: 2018-10-06 | End: 2018-10-10

## 2018-10-06 RX ORDER — KETOROLAC TROMETHAMINE 30 MG/ML
15 SYRINGE (ML) INJECTION ONCE
Qty: 0 | Refills: 0 | Status: DISCONTINUED | OUTPATIENT
Start: 2018-10-06 | End: 2018-10-06

## 2018-10-06 RX ORDER — METOCLOPRAMIDE HCL 10 MG
10 TABLET ORAL ONCE
Qty: 0 | Refills: 0 | Status: DISCONTINUED | OUTPATIENT
Start: 2018-10-06 | End: 2018-10-10

## 2018-10-06 RX ORDER — DEXAMETHASONE 0.5 MG/5ML
10 ELIXIR ORAL ONCE
Qty: 0 | Refills: 0 | Status: COMPLETED | OUTPATIENT
Start: 2018-10-06 | End: 2018-10-06

## 2018-10-06 RX ADMIN — Medication 15 MILLIGRAM(S): at 09:06

## 2018-10-06 NOTE — ED PROVIDER NOTE - PROGRESS NOTE DETAILS
Ramírez NYE - patient seen and reassessed. endorsing 2/10 headache now. discussed with neurology who will discuss with Dr. Bridges and arrange follow up next week. Ramírez NYE - patient seen and reassessed. now headache improved. 0/10 pain. patient will follow up with Dr. Bridges next week. patient agrees with plan. will dc home.

## 2018-10-06 NOTE — ED ADULT NURSE REASSESSMENT NOTE - NS ED NURSE REASSESS COMMENT FT1
As per MD Morocho, pt to only receive toradol medication as ordered at this time as pt's pain is currently 2/10. Neuro team paged by resident for consult. Will reassess

## 2018-10-09 ENCOUNTER — RESULT REVIEW (OUTPATIENT)
Age: 53
End: 2018-10-09

## 2018-10-09 ENCOUNTER — RESULT CHARGE (OUTPATIENT)
Age: 53
End: 2018-10-09

## 2018-10-09 NOTE — DISCUSSION/SUMMARY
[FreeTextEntry1] : 53F h/o SLE c/b nephritis, anxiety, depression, hypothyroidism seen in [facility] ED 10/6/18 for headache.  Labs unremarkable.  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  The patient was seen by Neurology, who arranged outpatient f/u.  The patient has an upcoming appointment with Neuro.

## 2018-10-23 ENCOUNTER — APPOINTMENT (OUTPATIENT)
Dept: NEUROLOGY | Facility: HOSPITAL | Age: 53
End: 2018-10-23

## 2018-10-30 ENCOUNTER — APPOINTMENT (OUTPATIENT)
Dept: RHEUMATOLOGY | Facility: CLINIC | Age: 53
End: 2018-10-30
Payer: MEDICAID

## 2018-10-30 VITALS
SYSTOLIC BLOOD PRESSURE: 111 MMHG | OXYGEN SATURATION: 98 % | HEART RATE: 74 BPM | DIASTOLIC BLOOD PRESSURE: 75 MMHG | BODY MASS INDEX: 18.95 KG/M2 | TEMPERATURE: 98 F | WEIGHT: 111 LBS | HEIGHT: 64 IN

## 2018-10-30 PROCEDURE — G0008: CPT

## 2018-10-30 PROCEDURE — 99214 OFFICE O/P EST MOD 30 MIN: CPT | Mod: 25

## 2018-10-30 PROCEDURE — 90686 IIV4 VACC NO PRSV 0.5 ML IM: CPT

## 2018-10-31 LAB
25(OH)D3 SERPL-MCNC: 55.4 NG/ML
ALBUMIN SERPL ELPH-MCNC: 4.1 G/DL
ALP BLD-CCNC: 74 U/L
ALT SERPL-CCNC: 6 U/L
ANION GAP SERPL CALC-SCNC: 12 MMOL/L
APPEARANCE: CLEAR
AST SERPL-CCNC: 15 U/L
BACTERIA: NEGATIVE
BASOPHILS # BLD AUTO: 0.01 K/UL
BASOPHILS NFR BLD AUTO: 0.2 %
BILIRUB SERPL-MCNC: 0.3 MG/DL
BILIRUBIN URINE: NEGATIVE
BLOOD URINE: NEGATIVE
BUN SERPL-MCNC: 26 MG/DL
C3 SERPL-MCNC: 102 MG/DL
C4 SERPL-MCNC: 22 MG/DL
CALCIUM SERPL-MCNC: 9.5 MG/DL
CHLORIDE SERPL-SCNC: 105 MMOL/L
CK SERPL-CCNC: 69 U/L
CO2 SERPL-SCNC: 24 MMOL/L
COLOR: YELLOW
CREAT SERPL-MCNC: 1.04 MG/DL
CREAT SPEC-SCNC: 85 MG/DL
CREAT/PROT UR: 0.6 RATIO
ENA RNP AB SER IA-ACNC: 3.3 AL
ENA SM AB SER IA-ACNC: 0.2 AL
ENA SS-A AB SER IA-ACNC: 0.2 AL
ENA SS-B AB SER IA-ACNC: <0.2 AL
EOSINOPHIL # BLD AUTO: 0.07 K/UL
EOSINOPHIL NFR BLD AUTO: 1.7 %
GLUCOSE QUALITATIVE U: NEGATIVE MG/DL
GLUCOSE SERPL-MCNC: 77 MG/DL
HCT VFR BLD CALC: 34 %
HGB BLD-MCNC: 10.8 G/DL
IMM GRANULOCYTES NFR BLD AUTO: 0 %
KETONES URINE: NEGATIVE
LEUKOCYTE ESTERASE URINE: NEGATIVE
LYMPHOCYTES # BLD AUTO: 1.69 K/UL
LYMPHOCYTES NFR BLD AUTO: 40.5 %
MAN DIFF?: NORMAL
MCHC RBC-ENTMCNC: 27.9 PG
MCHC RBC-ENTMCNC: 31.8 GM/DL
MCV RBC AUTO: 87.9 FL
MICROSCOPIC-UA: NORMAL
MONOCYTES # BLD AUTO: 0.34 K/UL
MONOCYTES NFR BLD AUTO: 8.2 %
NEUTROPHILS # BLD AUTO: 2.06 K/UL
NEUTROPHILS NFR BLD AUTO: 49.4 %
NITRITE URINE: NEGATIVE
PH URINE: 6.5
PLATELET # BLD AUTO: 232 K/UL
POTASSIUM SERPL-SCNC: 4.6 MMOL/L
PROT SERPL-MCNC: 6.9 G/DL
PROT UR-MCNC: 52 MG/DL
PROTEIN URINE: 30 MG/DL
RBC # BLD: 3.87 M/UL
RBC # FLD: 14.2 %
RED BLOOD CELLS URINE: 0 /HPF
SODIUM SERPL-SCNC: 141 MMOL/L
SPECIFIC GRAVITY URINE: 1.02
SQUAMOUS EPITHELIAL CELLS: 0 /HPF
TSH SERPL-ACNC: 3.3 UIU/ML
UROBILINOGEN URINE: NEGATIVE MG/DL
WBC # FLD AUTO: 4.17 K/UL
WHITE BLOOD CELLS URINE: 1 /HPF

## 2018-11-01 LAB — DSDNA AB SER-ACNC: 12 IU/ML

## 2018-11-21 ENCOUNTER — MEDICATION RENEWAL (OUTPATIENT)
Age: 53
End: 2018-11-21

## 2018-12-04 ENCOUNTER — OUTPATIENT (OUTPATIENT)
Dept: OUTPATIENT SERVICES | Facility: HOSPITAL | Age: 53
LOS: 1 days | End: 2018-12-04
Payer: SUBSIDIZED

## 2018-12-04 ENCOUNTER — APPOINTMENT (OUTPATIENT)
Dept: CV DIAGNOSITCS | Facility: HOSPITAL | Age: 53
End: 2018-12-04

## 2018-12-04 DIAGNOSIS — Z00.6 ENCOUNTER FOR EXAMINATION FOR NORMAL COMPARISON AND CONTROL IN CLINICAL RESEARCH PROGRAM: ICD-10-CM

## 2018-12-04 PROCEDURE — 93306 TTE W/DOPPLER COMPLETE: CPT

## 2018-12-04 PROCEDURE — 75571 CT HRT W/O DYE W/CA TEST: CPT | Mod: 26

## 2018-12-04 PROCEDURE — 75571 CT HRT W/O DYE W/CA TEST: CPT

## 2018-12-04 PROCEDURE — 93306 TTE W/DOPPLER COMPLETE: CPT | Mod: 26

## 2018-12-04 PROCEDURE — 0399T: CPT

## 2018-12-18 ENCOUNTER — APPOINTMENT (OUTPATIENT)
Dept: GASTROENTEROLOGY | Facility: HOSPITAL | Age: 53
End: 2018-12-18

## 2019-01-29 ENCOUNTER — APPOINTMENT (OUTPATIENT)
Dept: INTERNAL MEDICINE | Facility: CLINIC | Age: 54
End: 2019-01-29
Payer: MEDICAID

## 2019-01-29 ENCOUNTER — OUTPATIENT (OUTPATIENT)
Dept: OUTPATIENT SERVICES | Facility: HOSPITAL | Age: 54
LOS: 1 days | End: 2019-01-29
Payer: MEDICAID

## 2019-01-29 ENCOUNTER — RX RENEWAL (OUTPATIENT)
Age: 54
End: 2019-01-29

## 2019-01-29 VITALS
DIASTOLIC BLOOD PRESSURE: 70 MMHG | BODY MASS INDEX: 18.95 KG/M2 | WEIGHT: 111 LBS | SYSTOLIC BLOOD PRESSURE: 110 MMHG | HEIGHT: 64 IN

## 2019-01-29 DIAGNOSIS — J06.9 ACUTE UPPER RESPIRATORY INFECTION, UNSPECIFIED: ICD-10-CM

## 2019-01-29 DIAGNOSIS — I10 ESSENTIAL (PRIMARY) HYPERTENSION: ICD-10-CM

## 2019-01-29 DIAGNOSIS — Z87.09 PERSONAL HISTORY OF OTHER DISEASES OF THE RESPIRATORY SYSTEM: ICD-10-CM

## 2019-01-29 PROCEDURE — 99212 OFFICE O/P EST SF 10 MIN: CPT | Mod: GE

## 2019-01-29 PROCEDURE — G0463: CPT

## 2019-01-30 NOTE — REVIEW OF SYSTEMS
[Chills] : chills [Fatigue] : fatigue [Itching] : itching [Hoarseness] : hoarseness [Nasal Discharge] : nasal discharge [Sore Throat] : sore throat [Cough] : cough [Nausea] : nausea [Headache] : headache [Fever] : no fever [Vision Problems] : no vision problems [Chest Pain] : no chest pain [Palpitations] : no palpitations [Shortness Of Breath] : no shortness of breath [Abdominal Pain] : no abdominal pain [Diarrhea] : diarrhea [Vomiting] : no vomiting [Dysuria] : no dysuria [Hematuria] : no hematuria [Muscle Pain] : no muscle pain [Skin Rash] : no skin rash [Swollen Glands] : no swollen glands

## 2019-01-30 NOTE — PHYSICAL EXAM
[No Acute Distress] : no acute distress [PERRL] : pupils equal round and reactive to light [EOMI] : extraocular movements intact [No Lymphadenopathy] : no lymphadenopathy [No Respiratory Distress] : no respiratory distress  [Clear to Auscultation] : lungs were clear to auscultation bilaterally [Normal Rate] : normal rate  [Regular Rhythm] : with a regular rhythm [Normal S1, S2] : normal S1 and S2 [No Edema] : there was no peripheral edema [Soft] : abdomen soft [Non Tender] : non-tender [Non-distended] : non-distended [Normal Posterior Cervical Nodes] : no posterior cervical lymphadenopathy [Normal Anterior Cervical Nodes] : no anterior cervical lymphadenopathy [No Rash] : no rash [Alert and Oriented x3] : oriented to person, place, and time [de-identified] : boggy nasal turbinates, no exudates noted in the oral cavity

## 2019-01-30 NOTE — ASSESSMENT
[FreeTextEntry1] : 53F PMH SLE, lupus nephritis (on CellCept), anxiety, depression, hypothyroidism who is presenting today with cough and subjective fevers and chills.\par \par 1) Viral URI\par - patient's symptoms most consistent with a viral URI\par - recommended fluids, mucinex, nasal saline, cough drops, and tylenol for symptomatic relief\par - given patient is on immunosuppressants, explained to her that if her symptoms do not improve by Friday she should call the office back and get a prescription for a short course of azithromycin \par - patient expressed understanding with this plan\par \par Discussed with Dr. Leos

## 2019-01-30 NOTE — HISTORY OF PRESENT ILLNESS
[FreeTextEntry8] : 53F PMH SLE, lupus nephritis (on CellCept), anxiety, depression, hypothyroidism who is presenting today for an acute visit.\par \par She started to have a runny nose early last week. On Thursday, she was having worsening headache, tearing in the eyes, and a non productive cough. On Monday and Tuesday she started to have a productive cough but cannot qualify the sputum. Since Friday she's had a sore throat and started losing her voice. She's been having subjective fevers and chills along with myalgias. She tried Robitussin with minimal relief. She denies sick contacts. She got the flu shot this season. \par

## 2019-03-04 ENCOUNTER — APPOINTMENT (OUTPATIENT)
Dept: RHEUMATOLOGY | Facility: CLINIC | Age: 54
End: 2019-03-04
Payer: MEDICAID

## 2019-03-04 VITALS
DIASTOLIC BLOOD PRESSURE: 78 MMHG | BODY MASS INDEX: 18.44 KG/M2 | HEART RATE: 74 BPM | OXYGEN SATURATION: 97 % | WEIGHT: 108 LBS | SYSTOLIC BLOOD PRESSURE: 109 MMHG | HEIGHT: 64 IN

## 2019-03-04 DIAGNOSIS — M85.80 OTHER SPECIFIED DISORDERS OF BONE DENSITY AND STRUCTURE, UNSPECIFIED SITE: ICD-10-CM

## 2019-03-04 PROCEDURE — 99214 OFFICE O/P EST MOD 30 MIN: CPT

## 2019-03-05 LAB
25(OH)D3 SERPL-MCNC: 45.9 NG/ML
ALBUMIN SERPL ELPH-MCNC: 4.3 G/DL
ALP BLD-CCNC: 70 U/L
ALT SERPL-CCNC: 8 U/L
ANION GAP SERPL CALC-SCNC: 16 MMOL/L
APPEARANCE: CLEAR
AST SERPL-CCNC: 16 U/L
BACTERIA: NEGATIVE
BASOPHILS # BLD AUTO: 0.02 K/UL
BASOPHILS NFR BLD AUTO: 0.4 %
BILIRUB SERPL-MCNC: 0.3 MG/DL
BILIRUBIN URINE: NEGATIVE
BLOOD URINE: NEGATIVE
BUN SERPL-MCNC: 24 MG/DL
C3 SERPL-MCNC: 94 MG/DL
C4 SERPL-MCNC: 26 MG/DL
CALCIUM SERPL-MCNC: 9.6 MG/DL
CHLORIDE SERPL-SCNC: 105 MMOL/L
CK SERPL-CCNC: 44 U/L
CO2 SERPL-SCNC: 22 MMOL/L
COLOR: YELLOW
CREAT SERPL-MCNC: 1.11 MG/DL
CREAT SPEC-SCNC: 173 MG/DL
CREAT/PROT UR: 0.4 RATIO
EOSINOPHIL # BLD AUTO: 0.04 K/UL
EOSINOPHIL NFR BLD AUTO: 0.7 %
GLUCOSE QUALITATIVE U: NEGATIVE
GLUCOSE SERPL-MCNC: 79 MG/DL
HCT VFR BLD CALC: 35.9 %
HGB BLD-MCNC: 11.7 G/DL
HYALINE CASTS: 1 /LPF
IMM GRANULOCYTES NFR BLD AUTO: 0.2 %
KETONES URINE: NEGATIVE
LEUKOCYTE ESTERASE URINE: NEGATIVE
LYMPHOCYTES # BLD AUTO: 1.99 K/UL
LYMPHOCYTES NFR BLD AUTO: 35.7 %
MAN DIFF?: NORMAL
MCHC RBC-ENTMCNC: 29.8 PG
MCHC RBC-ENTMCNC: 32.6 GM/DL
MCV RBC AUTO: 91.3 FL
MICROSCOPIC-UA: NORMAL
MONOCYTES # BLD AUTO: 0.49 K/UL
MONOCYTES NFR BLD AUTO: 8.8 %
NEUTROPHILS # BLD AUTO: 3.03 K/UL
NEUTROPHILS NFR BLD AUTO: 54.2 %
NITRITE URINE: NEGATIVE
PH URINE: 6.5
PLATELET # BLD AUTO: 197 K/UL
POTASSIUM SERPL-SCNC: 4.6 MMOL/L
PROT SERPL-MCNC: 7.2 G/DL
PROT UR-MCNC: 66 MG/DL
PROTEIN URINE: ABNORMAL
RBC # BLD: 3.93 M/UL
RBC # FLD: 13.2 %
RED BLOOD CELLS URINE: 2 /HPF
SODIUM SERPL-SCNC: 142 MMOL/L
SPECIFIC GRAVITY URINE: 1.02
SQUAMOUS EPITHELIAL CELLS: 1 /HPF
TSH SERPL-ACNC: 2.82 UIU/ML
UROBILINOGEN URINE: NORMAL
WBC # FLD AUTO: 5.58 K/UL
WHITE BLOOD CELLS URINE: 0 /HPF

## 2019-03-06 LAB — DSDNA AB SER-ACNC: 12 IU/ML

## 2019-04-11 ENCOUNTER — APPOINTMENT (OUTPATIENT)
Dept: NEUROLOGY | Facility: HOSPITAL | Age: 54
End: 2019-04-11

## 2019-04-11 ENCOUNTER — OUTPATIENT (OUTPATIENT)
Dept: OUTPATIENT SERVICES | Facility: HOSPITAL | Age: 54
LOS: 1 days | End: 2019-04-11
Payer: MEDICAID

## 2019-04-11 VITALS
BODY MASS INDEX: 19.12 KG/M2 | DIASTOLIC BLOOD PRESSURE: 72 MMHG | SYSTOLIC BLOOD PRESSURE: 107 MMHG | WEIGHT: 112 LBS | HEART RATE: 61 BPM | HEIGHT: 64 IN

## 2019-04-11 DIAGNOSIS — G43.909 MIGRAINE, UNSPECIFIED, NOT INTRACTABLE, WITHOUT STATUS MIGRAINOSUS: ICD-10-CM

## 2019-04-11 DIAGNOSIS — R56.9 UNSPECIFIED CONVULSIONS: ICD-10-CM

## 2019-04-11 LAB
ESTIMATED AVERAGE GLUCOSE: 111 MG/DL — SIGNIFICANT CHANGE UP (ref 68–114)
HBA1C BLD-MCNC: 5.5 % — SIGNIFICANT CHANGE UP (ref 4–5.6)

## 2019-04-11 PROCEDURE — 80061 LIPID PANEL: CPT

## 2019-04-11 PROCEDURE — G0463: CPT

## 2019-04-11 PROCEDURE — 83036 HEMOGLOBIN GLYCOSYLATED A1C: CPT

## 2019-04-11 RX ORDER — TOPIRAMATE 50 MG/1
50 TABLET, FILM COATED ORAL DAILY
Refills: 0 | Status: DISCONTINUED | COMMUNITY
Start: 2017-08-04 | End: 2019-04-11

## 2019-04-11 RX ORDER — UBIDECARENONE 100 MG
100 TABLET,CHEWABLE ORAL
Qty: 30 | Refills: 3 | Status: DISCONTINUED | COMMUNITY
Start: 2017-08-10 | End: 2019-04-11

## 2019-04-11 RX ORDER — SERTRALINE HYDROCHLORIDE 100 MG/1
100 TABLET, FILM COATED ORAL DAILY
Refills: 0 | Status: DISCONTINUED | COMMUNITY
Start: 2017-08-04 | End: 2019-04-11

## 2019-04-11 RX ORDER — OLANZAPINE 7.5 MG/1
7.5 TABLET, FILM COATED ORAL DAILY
Refills: 0 | Status: DISCONTINUED | COMMUNITY
Start: 2017-08-04 | End: 2019-04-11

## 2019-04-11 RX ORDER — ZONISAMIDE 25 MG/1
25 CAPSULE ORAL DAILY
Qty: 90 | Refills: 1 | Status: DISCONTINUED | COMMUNITY
Start: 2018-09-25 | End: 2019-04-11

## 2019-04-11 NOTE — HISTORY OF PRESENT ILLNESS
[FreeTextEntry1] : Interval history:\par Headaches are improving with vitamin B12 and magnesium and riboflavin. Knows this because when she runs out, her headaches return. Insurance does not cover CoQ10.\par Did not feel that the nightly zonisamide helps. \par \par In terms of vision, she states that she sometimes experiences visual obscurations with prolonged use such as during reading and watching tv. Also occasional pain in right eye. Vision worse in right eye, more blurry. Has not been able to see ophthalmology due to time constraints. \par \par Reports no history of stroke. Does not take aspirin, was told to be careful with blood thinners by lupus doctor. \par \par MRI 10/18 (brain and orbits): On MR brain imaging, there are chronic bilateral superior cerebellar artery \par infarctions. \par Mild microvascular ischemic change. 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 \par Right staphyloma. \par \par \par Past history:\par Throbbing headache which may be one sided or holocephalic. It is associated with eye pain, but no changes in vision. She reports associated nausea, vomiting, photophobia and phonophobia. They have been occurring for years and are gradual onset, lasting for hours. Initially occuring 3-4 times a week. She had associated vertigo at times which occur whether she is standing or lying down. She is being followed by ENT. \par \par \par

## 2019-04-11 NOTE — REVIEW OF SYSTEMS
[Eye Pain] : eye pain [Eyesight Problems] : eyesight problems [Feeling Poorly] : not feeling poorly [Feeling Tired] : not feeling tired [Migraine Headache] : no migraine headache [Tension Headache] : no tension-type headache

## 2019-04-11 NOTE — PHYSICAL EXAM
[General Appearance - Well Nourished] : well nourished [General Appearance - Alert] : alert [Affect] : the affect was normal [General Appearance - Well Developed] : well developed [Mood] : the mood was normal [Person] : oriented to person [Place] : oriented to place [Time] : oriented to time [Cranial Nerves Oculomotor (III)] : extraocular motion intact [Cranial Nerves Vestibulocochlear (VIII)] : hearing was intact bilaterally [Motor Tone] : muscle tone was normal in all four extremities [Motor Strength] : muscle strength was normal in all four extremities [Sensation Tactile Decrease] : light touch was intact [Extraocular Movements] : extraocular movements were intact [Sclera] : the sclera and conjunctiva were normal [Full Visual Field] : full visual field [Exaggerated Use Of Accessory Muscles For Inspiration] : no accessory muscle use [Abnormal Walk] : normal gait [Nail Clubbing] : no clubbing  or cyanosis of the fingernails [Skin Color & Pigmentation] : normal skin color and pigmentation [] : no rash [Motor Strength Upper Extremities Bilaterally] : strength was normal in both upper extremities [Past-pointing] : there was no past-pointing [Tremor] : no tremor present [Motor Strength Lower Extremities Bilaterally] : strength was normal in both lower extremities [Coordination - Dysmetria Impaired Finger-to-Nose Bilateral] : not present [FreeTextEntry5] : R eye ADP, difficult to visualize optic discs but no gross abnormality

## 2019-04-11 NOTE — ASSESSMENT
[FreeTextEntry1] : 54 year old female with SLE, migraine headaches, vertigo presenting as follow up who received an MRI brain and orbits due to APD noted on exam and was found to have right optic nerve atrophy as well as cerebellar infarcts. Patient with subjective symptoms of visual disturbances and pain suggestive of possible past history of optic neuritis but more likely due to SLE (?vasculitis). \par Also warrants stroke risk factor work up given findings on MRI and family history. \par \par PLAN\par 1. Check LDL, HbA1c. \par 2. MRA head w/out, neck w/ contrast to evaluate posterior circulation vasculature.\par 3. Renew vitamins for headache.\par 4. Referral to neuro-ophthalmologist for evaulation of optic nerve involvement. \par 5. Follow up in 1-2 months with neurology clinic. \par 6. Plan discussed with Dr. ESHA Murillo.\par \par

## 2019-04-19 ENCOUNTER — RX RENEWAL (OUTPATIENT)
Age: 54
End: 2019-04-19

## 2019-04-29 ENCOUNTER — APPOINTMENT (OUTPATIENT)
Dept: MRI IMAGING | Facility: CLINIC | Age: 54
End: 2019-04-29
Payer: MEDICAID

## 2019-04-29 ENCOUNTER — OUTPATIENT (OUTPATIENT)
Dept: OUTPATIENT SERVICES | Facility: HOSPITAL | Age: 54
LOS: 1 days | End: 2019-04-29
Payer: MEDICAID

## 2019-04-29 DIAGNOSIS — G43.109 MIGRAINE WITH AURA, NOT INTRACTABLE, WITHOUT STATUS MIGRAINOSUS: ICD-10-CM

## 2019-04-29 PROCEDURE — 70549 MR ANGIOGRAPH NECK W/O&W/DYE: CPT | Mod: 26

## 2019-04-29 PROCEDURE — 70544 MR ANGIOGRAPHY HEAD W/O DYE: CPT | Mod: 26

## 2019-04-29 PROCEDURE — A9585: CPT

## 2019-04-29 PROCEDURE — 70549 MR ANGIOGRAPH NECK W/O&W/DYE: CPT

## 2019-04-29 PROCEDURE — 70544 MR ANGIOGRAPHY HEAD W/O DYE: CPT

## 2019-05-06 ENCOUNTER — APPOINTMENT (OUTPATIENT)
Dept: RHEUMATOLOGY | Facility: CLINIC | Age: 54
End: 2019-05-06
Payer: MEDICAID

## 2019-05-06 VITALS
SYSTOLIC BLOOD PRESSURE: 121 MMHG | DIASTOLIC BLOOD PRESSURE: 77 MMHG | HEIGHT: 64 IN | TEMPERATURE: 98.2 F | OXYGEN SATURATION: 98 % | WEIGHT: 112 LBS | HEART RATE: 76 BPM | BODY MASS INDEX: 19.12 KG/M2

## 2019-05-06 LAB
BASOPHILS # BLD AUTO: 0.02 K/UL
BASOPHILS NFR BLD AUTO: 0.4 %
EOSINOPHIL # BLD AUTO: 0.02 K/UL
EOSINOPHIL NFR BLD AUTO: 0.4 %
HCT VFR BLD CALC: 37.1 %
HGB BLD-MCNC: 11.7 G/DL
IMM GRANULOCYTES NFR BLD AUTO: 0.2 %
LYMPHOCYTES # BLD AUTO: 1.74 K/UL
LYMPHOCYTES NFR BLD AUTO: 36.3 %
MAN DIFF?: NORMAL
MCHC RBC-ENTMCNC: 29.3 PG
MCHC RBC-ENTMCNC: 31.5 GM/DL
MCV RBC AUTO: 93 FL
MONOCYTES # BLD AUTO: 0.51 K/UL
MONOCYTES NFR BLD AUTO: 10.6 %
NEUTROPHILS # BLD AUTO: 2.5 K/UL
NEUTROPHILS NFR BLD AUTO: 52.1 %
PLATELET # BLD AUTO: 210 K/UL
RBC # BLD: 3.99 M/UL
RBC # FLD: 13.2 %
WBC # FLD AUTO: 4.8 K/UL

## 2019-05-06 PROCEDURE — 99214 OFFICE O/P EST MOD 30 MIN: CPT

## 2019-05-07 LAB
25(OH)D3 SERPL-MCNC: 42.3 NG/ML
ALBUMIN SERPL ELPH-MCNC: 4.2 G/DL
ALP BLD-CCNC: 80 U/L
ALT SERPL-CCNC: 8 U/L
ANION GAP SERPL CALC-SCNC: 13 MMOL/L
APPEARANCE: CLEAR
AST SERPL-CCNC: 17 U/L
BACTERIA: NEGATIVE
BILIRUB SERPL-MCNC: 0.3 MG/DL
BILIRUBIN URINE: NEGATIVE
BLOOD URINE: NEGATIVE
BUN SERPL-MCNC: 27 MG/DL
C3 SERPL-MCNC: 113 MG/DL
C4 SERPL-MCNC: 27 MG/DL
CALCIUM SERPL-MCNC: 9.6 MG/DL
CHLORIDE SERPL-SCNC: 107 MMOL/L
CK SERPL-CCNC: 111 U/L
CO2 SERPL-SCNC: 22 MMOL/L
COLOR: NORMAL
CREAT SERPL-MCNC: 1.03 MG/DL
CREAT SPEC-SCNC: 110 MG/DL
CREAT/PROT UR: 1.1 RATIO
ENA RNP AB SER IA-ACNC: 2.9 AL
ENA SM AB SER IA-ACNC: 0.2 AL
GLUCOSE QUALITATIVE U: NEGATIVE
GLUCOSE SERPL-MCNC: 93 MG/DL
HYALINE CASTS: 1 /LPF
KETONES URINE: NEGATIVE
LEUKOCYTE ESTERASE URINE: NEGATIVE
MICROSCOPIC-UA: NORMAL
NITRITE URINE: NEGATIVE
PH URINE: 6
POTASSIUM SERPL-SCNC: 4.4 MMOL/L
PROT SERPL-MCNC: 7.1 G/DL
PROT UR-MCNC: 115 MG/DL
PROTEIN URINE: ABNORMAL
RED BLOOD CELLS URINE: 0 /HPF
SODIUM SERPL-SCNC: 142 MMOL/L
SPECIFIC GRAVITY URINE: 1.02
SQUAMOUS EPITHELIAL CELLS: 0 /HPF
TSH SERPL-ACNC: 1.8 UIU/ML
UROBILINOGEN URINE: NORMAL
WHITE BLOOD CELLS URINE: 0 /HPF

## 2019-05-08 LAB — DSDNA AB SER-ACNC: 14 IU/ML

## 2019-05-14 ENCOUNTER — MEDICATION RENEWAL (OUTPATIENT)
Age: 54
End: 2019-05-14

## 2019-05-14 ENCOUNTER — APPOINTMENT (OUTPATIENT)
Dept: GASTROENTEROLOGY | Facility: HOSPITAL | Age: 54
End: 2019-05-14

## 2019-05-16 RX ORDER — VALACYCLOVIR 1 G/1
1 TABLET, FILM COATED ORAL
Qty: 4 | Refills: 0 | Status: DISCONTINUED | COMMUNITY
End: 2019-05-16

## 2019-05-16 RX ORDER — VALACYCLOVIR 1 G/1
1 TABLET, FILM COATED ORAL
Refills: 0 | Status: DISCONTINUED | COMMUNITY
End: 2019-05-16

## 2019-05-16 RX ORDER — MAGNESIUM OXIDE 400 MG
400 (241.3 MG) TABLET ORAL
Qty: 30 | Refills: 2 | Status: DISCONTINUED | COMMUNITY
Start: 2018-05-21 | End: 2019-05-16

## 2019-06-05 ENCOUNTER — MEDICATION RENEWAL (OUTPATIENT)
Age: 54
End: 2019-06-05

## 2019-06-06 ENCOUNTER — APPOINTMENT (OUTPATIENT)
Dept: NEUROLOGY | Facility: HOSPITAL | Age: 54
End: 2019-06-06

## 2019-06-14 ENCOUNTER — MEDICATION RENEWAL (OUTPATIENT)
Age: 54
End: 2019-06-14

## 2019-06-17 ENCOUNTER — MEDICATION RENEWAL (OUTPATIENT)
Age: 54
End: 2019-06-17

## 2019-06-19 ENCOUNTER — MEDICATION RENEWAL (OUTPATIENT)
Age: 54
End: 2019-06-19

## 2019-07-09 ENCOUNTER — APPOINTMENT (OUTPATIENT)
Dept: NEUROLOGY | Facility: HOSPITAL | Age: 54
End: 2019-07-09

## 2019-07-09 ENCOUNTER — OUTPATIENT (OUTPATIENT)
Dept: OUTPATIENT SERVICES | Facility: HOSPITAL | Age: 54
LOS: 1 days | End: 2019-07-09
Payer: MEDICAID

## 2019-07-09 VITALS
HEIGHT: 64 IN | BODY MASS INDEX: 19.29 KG/M2 | DIASTOLIC BLOOD PRESSURE: 80 MMHG | HEART RATE: 78 BPM | RESPIRATION RATE: 14 BRPM | WEIGHT: 113 LBS | SYSTOLIC BLOOD PRESSURE: 119 MMHG

## 2019-07-09 DIAGNOSIS — R51 HEADACHE: ICD-10-CM

## 2019-07-09 DIAGNOSIS — G43.909 MIGRAINE, UNSPECIFIED, NOT INTRACTABLE, W/OUT STATUS MIGRAINOSUS: ICD-10-CM

## 2019-07-09 PROCEDURE — G0463: CPT

## 2019-07-09 NOTE — PHYSICAL EXAM
[General Appearance - Alert] : alert [General Appearance - Well Nourished] : well nourished [General Appearance - Well Developed] : well developed [Affect] : the affect was normal [Mood] : the mood was normal [Person] : oriented to person [Place] : oriented to place [Time] : oriented to time [Cranial Nerves Oculomotor (III)] : extraocular motion intact [Cranial Nerves Vestibulocochlear (VIII)] : hearing was intact bilaterally [Motor Tone] : muscle tone was normal in all four extremities [Motor Strength] : muscle strength was normal in all four extremities [Sensation Tactile Decrease] : light touch was intact [Sclera] : the sclera and conjunctiva were normal [Extraocular Movements] : extraocular movements were intact [Full Visual Field] : full visual field [Exaggerated Use Of Accessory Muscles For Inspiration] : no accessory muscle use [Abnormal Walk] : normal gait [Nail Clubbing] : no clubbing  or cyanosis of the fingernails [Skin Color & Pigmentation] : normal skin color and pigmentation [] : no rash [Motor Strength Upper Extremities Bilaterally] : strength was normal in both upper extremities [Motor Strength Lower Extremities Bilaterally] : strength was normal in both lower extremities [Past-pointing] : there was no past-pointing [Tremor] : no tremor present [Coordination - Dysmetria Impaired Finger-to-Nose Bilateral] : not present [FreeTextEntry5] : R eye ADP, difficult to visualize optic discs but no gross abnormality

## 2019-07-09 NOTE — ASSESSMENT
[FreeTextEntry1] : 54 year old female with SLE, migraine headaches, vertigo presenting as follow up who received an MRI brain and orbits due to APD noted on exam and was found to have right optic nerve atrophy as well as cerebellar infarcts. Patient with subjective symptoms of visual disturbances and pain suggestive of possible past history of optic neuritis but more likely due to SLE (?vasculitis). \par \par MRA H&N reviewed unremarkable. \par \par [] Renewed migraine PPx magnesium, vit b2 sent to mail order Rx [facility] per pt request\par [] Renew referral for neuro opthalmology\par \par Pt's questions, concerns addressed. she voiced understanding.\par \par RTC in 3 months or PRN\par \par d/w Dr. MERNA Murillo

## 2019-07-09 NOTE — HISTORY OF PRESENT ILLNESS
[FreeTextEntry1] : Interval history: \par Returning for med refills and to go over MRA results.\par States HAs are well controlled on current regimen except she is unable to get meds as her insurance changed her pharmacy. Reports he was not able to see neuro-optho as her mom was sick. \par Denies any other complaints at this time. \par \par \par MRI 10/18 (brain and orbits): On MR brain imaging, there are chronic bilateral superior cerebellar artery \par infarctions. Mild microvascular ischemic change. 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 \par Right staphyloma. \par \par \par Past history:\par Throbbing headache which may be one sided or holocephalic. It is associated with eye pain, but no changes in vision. She reports associated nausea, vomiting, photophobia and phonophobia. They have been occurring for years and are gradual onset, lasting for hours. Initially occuring 3-4 times a week. She had associated vertigo at times which occur whether she is standing or lying down. She is being followed by ENT. \par \par \par

## 2019-07-10 DIAGNOSIS — G43.109 MIGRAINE WITH AURA, NOT INTRACTABLE, WITHOUT STATUS MIGRAINOSUS: ICD-10-CM

## 2019-07-10 DIAGNOSIS — G43.909 MIGRAINE, UNSPECIFIED, NOT INTRACTABLE, WITHOUT STATUS MIGRAINOSUS: ICD-10-CM

## 2019-07-23 ENCOUNTER — APPOINTMENT (OUTPATIENT)
Dept: GASTROENTEROLOGY | Facility: HOSPITAL | Age: 54
End: 2019-07-23

## 2019-07-23 ENCOUNTER — OUTPATIENT (OUTPATIENT)
Dept: OUTPATIENT SERVICES | Facility: HOSPITAL | Age: 54
LOS: 1 days | End: 2019-07-23
Payer: MEDICAID

## 2019-07-23 VITALS
SYSTOLIC BLOOD PRESSURE: 103 MMHG | HEART RATE: 71 BPM | BODY MASS INDEX: 19.29 KG/M2 | DIASTOLIC BLOOD PRESSURE: 73 MMHG | HEIGHT: 64 IN | WEIGHT: 113 LBS

## 2019-07-23 DIAGNOSIS — R12 HEARTBURN: ICD-10-CM

## 2019-07-23 DIAGNOSIS — R10.9 UNSPECIFIED ABDOMINAL PAIN: ICD-10-CM

## 2019-07-23 PROCEDURE — G0463: CPT

## 2019-07-24 NOTE — HISTORY OF PRESENT ILLNESS
[___ Month(s) Ago] : [unfilled] month(s) ago [Heartburn] : stable heartburn [Vomiting] : stable vomiting [Nausea] : stable nausea [de-identified] : 53F with of lupus c/b lupus nephritis, anxiety, depression, hypothyroidism here for f/u LUQ pain, GERD, and h/o dysphagia due to distal esophageal stricture. \par \par Dysphagia: She had dysphagia in 7051-0004 and was found to have a distal esophageal stricture that required intermittent dilations (last in 2016, up to 18mm). Since then she has reported resolution of dysphagia. \par \par She has endorsed symptoms of heartburn and LUQ pain since 2014, and treated with PPI and H2 blocker. She was found to have candida esophagitis in 2015, and subsequent EGDs with biopsies that were negative for H Pylori and eosinophilic esophagitis. She also had a colonoscopy in 2014 that showed internal hemorrhoids, but no evidence of microscopic colitis.\par \par She was last seen 3/2018 for heartburn and LUQ pain, improved from prior w/ Levsin and PPI. Her last EGD in 12/2016 had showed LA Grade B esophagitis, and a small distal esophageal ulcer with no strictures. She denies early satiety. She tested negative for antibodies for scleroderma.\par \par 11/2017: Gastric emptying study: Normal gastric emptying study. Normal solid and liquid phase \par gastric emptying. No evidence of gastroparesis.\par \par At last visit, plan was to scheduled EGD + Bravo study but pt did not complete 2/2 unclear insurance issues (pt cannot remember). Pt's sx persisted despite BID PPI. [FreeTextEntry1] : 54F w/ lupus and lupus nephritis and prior esophageal stricture s/p dilation, and 3cm hiatal hernia, p/w persistent reflux sx despite BID PPI therapy. No alarm signs, normal gastric emptying seen on study. Unclear if sx are 2/2 true reflux or esophageal hypersensitivity or functional heartburn. She does have reasons for and signs of reflux (Aristeo's, esophageal ulcers in the past, hiatal hernia) but sx not at all improved on BID PPI taken correctly.

## 2019-07-24 NOTE — PHYSICAL EXAM
[General Appearance - Alert] : alert [General Appearance - Well Developed] : well developed [General Appearance - Well Nourished] : well nourished [General Appearance - In No Acute Distress] : in no acute distress [Sclera] : the sclera and conjunctiva were normal [Neck Appearance] : the appearance of the neck was normal [Neck Cervical Mass (___cm)] : no neck mass was observed [Exaggerated Use Of Accessory Muscles For Inspiration] : no accessory muscle use [Bowel Sounds] : normal bowel sounds [] : no hepato-splenomegaly [Abdomen Tenderness] : non-tender [Abdomen Soft] : soft [Abdomen Mass (___ Cm)] : no abdominal mass palpated [Abnormal Walk] : normal gait [Respiration, Rhythm And Depth] : normal respiratory rhythm and effort [Auscultation Breath Sounds / Voice Sounds] : lungs were clear to auscultation bilaterally [Heart Rate And Rhythm] : heart rate was normal and rhythm regular [Heart Sounds] : normal S1 and S2 [Murmurs] : no murmurs [Edema] : there was no peripheral edema [Cervical Lymph Nodes Enlarged Posterior Bilaterally] : posterior cervical [Cervical Lymph Nodes Enlarged Anterior Bilaterally] : anterior cervical [No Focal Deficits] : no focal deficits [Supraclavicular Lymph Nodes Enlarged Bilaterally] : supraclavicular [FreeTextEntry1] : aox3 [Impaired Insight] : insight and judgment were intact [Affect] : the affect was normal

## 2019-07-24 NOTE — ASSESSMENT
[FreeTextEntry1] : Impression:\par 1) Reflux sx - despite BID PPI\par 2) LUQ Pain - ddx GERD, IBS\par 3) History of Distal Esophageal Stricture - likely due to GERD. Not noted on last EGD in 12/2016.\par 4) History of dysphagia due to esophageal stricture s/p dilation, now resolved.\par 5) Screening colonoscopy in 2014, next due in 2024 \par \par Recommendations:\par - EGD w/ Bravo w/ Dr. Marie to evaluate role of acid in pt's sx\par - if positive, pt will likely need intervention to reduce reflux (hernia repair vs fundoplication)\par - if negative, will tx functional heartburn w/ gabapentin\par - if unable to complete study, will trial gabapentin\par - f/u in 6-8 weeks

## 2019-07-24 NOTE — END OF VISIT
[] : Fellow [FreeTextEntry3] : 62 yo F pmh SLE c/b Nephritis who presents for follow up of abdominal pain and dysphagia 2/2 esophageal stricture s/p dilation with resolution of symptoms.  Post prandial abdominal discomfort and reflux (heartburn/regurgitation) minimally improved on BID PPI.  GES normal in past, Imaging negative in past.  Planned for EGD/Bravo in past - unable to do 2/2 insurance.  UTD CRC Screening (due 2024 for repeat colon).  DDx: GERD despite therapy (does have esophagitis, HH in past), functional heartburn, esophageal hypersensitivity.\par - EGD/Bravo - will clarify insurance issue\par - If not approved for Bravo, may just need EGD to assess esophagitis healing\par - Pending above to consider trial of gabapentin (on SSRI so cannot use TCA) for functional heartburn

## 2019-07-25 ENCOUNTER — RX RENEWAL (OUTPATIENT)
Age: 54
End: 2019-07-25

## 2019-07-26 ENCOUNTER — RX RENEWAL (OUTPATIENT)
Age: 54
End: 2019-07-26

## 2019-08-01 ENCOUNTER — APPOINTMENT (OUTPATIENT)
Dept: RHEUMATOLOGY | Facility: CLINIC | Age: 54
End: 2019-08-01
Payer: MEDICAID

## 2019-08-01 VITALS
HEART RATE: 65 BPM | TEMPERATURE: 98.2 F | DIASTOLIC BLOOD PRESSURE: 77 MMHG | HEIGHT: 64 IN | WEIGHT: 113 LBS | SYSTOLIC BLOOD PRESSURE: 110 MMHG | OXYGEN SATURATION: 98 % | BODY MASS INDEX: 19.29 KG/M2

## 2019-08-01 PROCEDURE — 99214 OFFICE O/P EST MOD 30 MIN: CPT

## 2019-08-02 ENCOUNTER — FORM ENCOUNTER (OUTPATIENT)
Age: 54
End: 2019-08-02

## 2019-08-03 ENCOUNTER — APPOINTMENT (OUTPATIENT)
Dept: RADIOLOGY | Facility: CLINIC | Age: 54
End: 2019-08-03
Payer: MEDICAID

## 2019-08-03 ENCOUNTER — OUTPATIENT (OUTPATIENT)
Dept: OUTPATIENT SERVICES | Facility: HOSPITAL | Age: 54
LOS: 1 days | End: 2019-08-03
Payer: MEDICAID

## 2019-08-03 DIAGNOSIS — Z00.8 ENCOUNTER FOR OTHER GENERAL EXAMINATION: ICD-10-CM

## 2019-08-03 LAB
25(OH)D3 SERPL-MCNC: 38.5 NG/ML
ALBUMIN SERPL ELPH-MCNC: 4.3 G/DL
ALP BLD-CCNC: 81 U/L
ALT SERPL-CCNC: 9 U/L
ANION GAP SERPL CALC-SCNC: 16 MMOL/L
APPEARANCE: CLEAR
AST SERPL-CCNC: 12 U/L
BACTERIA: NEGATIVE
BASOPHILS # BLD AUTO: 0.02 K/UL
BASOPHILS NFR BLD AUTO: 0.5 %
BILIRUB SERPL-MCNC: 0.4 MG/DL
BILIRUBIN URINE: NEGATIVE
BLOOD URINE: NEGATIVE
BUN SERPL-MCNC: 29 MG/DL
C3 SERPL-MCNC: 107 MG/DL
C4 SERPL-MCNC: 26 MG/DL
CALCIUM SERPL-MCNC: 9.3 MG/DL
CHLORIDE SERPL-SCNC: 104 MMOL/L
CK SERPL-CCNC: 48 U/L
CO2 SERPL-SCNC: 21 MMOL/L
COLOR: NORMAL
CREAT SERPL-MCNC: 1.04 MG/DL
CREAT SPEC-SCNC: 146 MG/DL
CREAT/PROT UR: 0.4 RATIO
EOSINOPHIL # BLD AUTO: 0.03 K/UL
EOSINOPHIL NFR BLD AUTO: 0.7 %
GLUCOSE QUALITATIVE U: NEGATIVE
GLUCOSE SERPL-MCNC: 92 MG/DL
HCT VFR BLD CALC: 37.3 %
HGB BLD-MCNC: 11.8 G/DL
HYALINE CASTS: 0 /LPF
IMM GRANULOCYTES NFR BLD AUTO: 0.2 %
KETONES URINE: NEGATIVE
LEUKOCYTE ESTERASE URINE: NEGATIVE
LYMPHOCYTES # BLD AUTO: 1.45 K/UL
LYMPHOCYTES NFR BLD AUTO: 34.7 %
MAN DIFF?: NORMAL
MCHC RBC-ENTMCNC: 29.4 PG
MCHC RBC-ENTMCNC: 31.6 GM/DL
MCV RBC AUTO: 93 FL
MICROSCOPIC-UA: NORMAL
MONOCYTES # BLD AUTO: 0.46 K/UL
MONOCYTES NFR BLD AUTO: 11 %
NEUTROPHILS # BLD AUTO: 2.21 K/UL
NEUTROPHILS NFR BLD AUTO: 52.9 %
NITRITE URINE: NEGATIVE
PH URINE: 6
PLATELET # BLD AUTO: 222 K/UL
POTASSIUM SERPL-SCNC: 4.4 MMOL/L
PROT SERPL-MCNC: 7.1 G/DL
PROT UR-MCNC: 58 MG/DL
PROTEIN URINE: ABNORMAL
RBC # BLD: 4.01 M/UL
RBC # FLD: 13.3 %
RED BLOOD CELLS URINE: 1 /HPF
SODIUM SERPL-SCNC: 141 MMOL/L
SPECIFIC GRAVITY URINE: 1.03
SQUAMOUS EPITHELIAL CELLS: 0 /HPF
TSH SERPL-ACNC: 3.3 UIU/ML
UROBILINOGEN URINE: NORMAL
WBC # FLD AUTO: 4.18 K/UL
WHITE BLOOD CELLS URINE: 0 /HPF

## 2019-08-03 PROCEDURE — 72100 X-RAY EXAM L-S SPINE 2/3 VWS: CPT | Mod: 26

## 2019-08-03 PROCEDURE — 72100 X-RAY EXAM L-S SPINE 2/3 VWS: CPT

## 2019-08-05 LAB — DSDNA AB SER-ACNC: <12 IU/ML

## 2019-08-06 ENCOUNTER — APPOINTMENT (OUTPATIENT)
Dept: RHEUMATOLOGY | Facility: CLINIC | Age: 54
End: 2019-08-06

## 2019-08-16 ENCOUNTER — APPOINTMENT (OUTPATIENT)
Age: 54
End: 2019-08-16

## 2019-08-22 ENCOUNTER — OUTPATIENT (OUTPATIENT)
Dept: OUTPATIENT SERVICES | Facility: HOSPITAL | Age: 54
LOS: 1 days | End: 2019-08-22
Payer: MEDICAID

## 2019-08-22 ENCOUNTER — APPOINTMENT (OUTPATIENT)
Dept: INTERNAL MEDICINE | Facility: CLINIC | Age: 54
End: 2019-08-22
Payer: MEDICAID

## 2019-08-22 ENCOUNTER — NON-APPOINTMENT (OUTPATIENT)
Age: 54
End: 2019-08-22

## 2019-08-22 VITALS
OXYGEN SATURATION: 96 % | HEART RATE: 85 BPM | WEIGHT: 113 LBS | DIASTOLIC BLOOD PRESSURE: 60 MMHG | HEIGHT: 64 IN | SYSTOLIC BLOOD PRESSURE: 90 MMHG | BODY MASS INDEX: 19.29 KG/M2

## 2019-08-22 DIAGNOSIS — M25.50 PAIN IN UNSPECIFIED JOINT: ICD-10-CM

## 2019-08-22 DIAGNOSIS — R51 HEADACHE: ICD-10-CM

## 2019-08-22 DIAGNOSIS — K21.9 GASTRO-ESOPHAGEAL REFLUX DISEASE WITHOUT ESOPHAGITIS: ICD-10-CM

## 2019-08-22 DIAGNOSIS — F32.9 MAJOR DEPRESSIVE DISORDER, SINGLE EPISODE, UNSPECIFIED: ICD-10-CM

## 2019-08-22 DIAGNOSIS — M32.9 SYSTEMIC LUPUS ERYTHEMATOSUS, UNSPECIFIED: ICD-10-CM

## 2019-08-22 DIAGNOSIS — M32.14 GLOMERULAR DISEASE IN SYSTEMIC LUPUS ERYTHEMATOSUS: ICD-10-CM

## 2019-08-22 DIAGNOSIS — E03.9 HYPOTHYROIDISM, UNSPECIFIED: ICD-10-CM

## 2019-08-22 DIAGNOSIS — I10 ESSENTIAL (PRIMARY) HYPERTENSION: ICD-10-CM

## 2019-08-22 DIAGNOSIS — Z00.00 ENCOUNTER FOR GENERAL ADULT MEDICAL EXAMINATION WITHOUT ABNORMAL FINDINGS: ICD-10-CM

## 2019-08-22 PROCEDURE — 99213 OFFICE O/P EST LOW 20 MIN: CPT | Mod: GE

## 2019-08-22 PROCEDURE — 99396 PREV VISIT EST AGE 40-64: CPT | Mod: GC

## 2019-08-26 ENCOUNTER — LABORATORY RESULT (OUTPATIENT)
Age: 54
End: 2019-08-26

## 2019-08-26 ENCOUNTER — APPOINTMENT (OUTPATIENT)
Dept: RHEUMATOLOGY | Facility: CLINIC | Age: 54
End: 2019-08-26

## 2019-08-26 PROCEDURE — 80061 LIPID PANEL: CPT

## 2019-08-26 PROCEDURE — G0463: CPT

## 2019-08-26 PROCEDURE — 83036 HEMOGLOBIN GLYCOSYLATED A1C: CPT

## 2019-08-26 PROCEDURE — 93005 ELECTROCARDIOGRAM TRACING: CPT

## 2019-08-26 PROCEDURE — 81329 SMN1 GENE DOS/DELETION ALYS: CPT

## 2019-08-26 PROCEDURE — 82728 ASSAY OF FERRITIN: CPT

## 2019-08-26 PROCEDURE — 82607 VITAMIN B-12: CPT

## 2019-08-26 PROCEDURE — 86255 FLUORESCENT ANTIBODY SCREEN: CPT

## 2019-08-27 LAB
CHOLEST SERPL-MCNC: 167 MG/DL — SIGNIFICANT CHANGE UP (ref 10–199)
ESTIMATED AVERAGE GLUCOSE: 108 MG/DL — SIGNIFICANT CHANGE UP (ref 68–114)
FERRITIN SERPL-MCNC: 27 NG/ML — SIGNIFICANT CHANGE UP (ref 15–150)
HBA1C BLD-MCNC: 5.4 % — SIGNIFICANT CHANGE UP (ref 4–5.6)
HDLC SERPL-MCNC: 50 MG/DL — SIGNIFICANT CHANGE UP
LIPID PNL WITH DIRECT LDL SERPL: 86 MG/DL — SIGNIFICANT CHANGE UP
TOTAL CHOLESTEROL/HDL RATIO MEASUREMENT: 3.3 RATIO — SIGNIFICANT CHANGE UP (ref 3.3–7.1)
TRIGL SERPL-MCNC: 156 MG/DL — HIGH (ref 10–149)
VIT B12 SERPL-MCNC: 727 PG/ML — SIGNIFICANT CHANGE UP (ref 232–1245)

## 2019-08-28 ENCOUNTER — LABORATORY RESULT (OUTPATIENT)
Age: 54
End: 2019-08-28

## 2019-08-28 ENCOUNTER — APPOINTMENT (OUTPATIENT)
Dept: OBGYN | Facility: CLINIC | Age: 54
End: 2019-08-28
Payer: MEDICAID

## 2019-08-28 ENCOUNTER — OUTPATIENT (OUTPATIENT)
Dept: OUTPATIENT SERVICES | Facility: HOSPITAL | Age: 54
LOS: 1 days | End: 2019-08-28
Payer: MEDICAID

## 2019-08-28 VITALS
BODY MASS INDEX: 19.81 KG/M2 | HEIGHT: 64 IN | DIASTOLIC BLOOD PRESSURE: 60 MMHG | SYSTOLIC BLOOD PRESSURE: 118 MMHG | WEIGHT: 116 LBS

## 2019-08-28 DIAGNOSIS — N95.2 POSTMENOPAUSAL ATROPHIC VAGINITIS: ICD-10-CM

## 2019-08-28 DIAGNOSIS — N76.0 ACUTE VAGINITIS: ICD-10-CM

## 2019-08-28 DIAGNOSIS — R10.2 PELVIC AND PERINEAL PAIN: ICD-10-CM

## 2019-08-28 DIAGNOSIS — Z01.419 ENCOUNTER FOR GYNECOLOGICAL EXAMINATION (GENERAL) (ROUTINE) W/OUT ABNORMAL FINDINGS: ICD-10-CM

## 2019-08-28 DIAGNOSIS — Z01.419 ENCOUNTER FOR GYNECOLOGICAL EXAMINATION (GENERAL) (ROUTINE) WITHOUT ABNORMAL FINDINGS: ICD-10-CM

## 2019-08-28 PROCEDURE — 87591 N.GONORRHOEAE DNA AMP PROB: CPT

## 2019-08-28 PROCEDURE — 88175 CYTOPATH C/V AUTO FLUID REDO: CPT

## 2019-08-28 PROCEDURE — G0463: CPT

## 2019-08-28 PROCEDURE — 87624 HPV HI-RISK TYP POOLED RSLT: CPT

## 2019-08-28 PROCEDURE — 87491 CHLMYD TRACH DNA AMP PROBE: CPT

## 2019-08-28 PROCEDURE — 99396 PREV VISIT EST AGE 40-64: CPT

## 2019-08-28 NOTE — HISTORY OF PRESENT ILLNESS
[6 Months Ago] : 6 months ago [Fair] : being in fair health [Healthy Diet] : a healthy diet [Last Mammogram ___] : Last Mammogram was [unfilled] [Regular Exercise] : not exercising regularly [Postmenopausal] : is postmenopausal [Menopause Age: ____] : age at menopause was [unfilled] [Sexually Active] : is sexually active [Monogamous] : is monogamous

## 2019-08-28 NOTE — PHYSICAL EXAM
[Awake] : awake [Alert] : alert [Acute Distress] : no acute distress [LAD] : no lymphadenopathy [Thyroid Nodule] : no thyroid nodule [Mass] : no breast mass [Goiter] : no goiter [Nipple Discharge] : no nipple discharge [Soft] : soft [Axillary LAD] : no axillary lymphadenopathy [Oriented x3] : oriented to person, place, and time [Tender] : non tender [No Bleeding] : there was no active vaginal bleeding [Normal] : uterus [Uterine Adnexae] : were not tender and not enlarged

## 2019-08-29 LAB
C TRACH RRNA SPEC QL NAA+PROBE: SIGNIFICANT CHANGE UP
HPV HIGH+LOW RISK DNA PNL CVX: SIGNIFICANT CHANGE UP
N GONORRHOEA RRNA SPEC QL NAA+PROBE: SIGNIFICANT CHANGE UP
SPECIMEN SOURCE: SIGNIFICANT CHANGE UP

## 2019-09-01 ENCOUNTER — OUTPATIENT (OUTPATIENT)
Dept: OUTPATIENT SERVICES | Facility: HOSPITAL | Age: 54
LOS: 1 days | End: 2019-09-01
Payer: MEDICAID

## 2019-09-01 PROCEDURE — H0002: CPT

## 2019-09-02 ENCOUNTER — FORM ENCOUNTER (OUTPATIENT)
Age: 54
End: 2019-09-02

## 2019-09-03 ENCOUNTER — OUTPATIENT (OUTPATIENT)
Dept: OUTPATIENT SERVICES | Facility: HOSPITAL | Age: 54
LOS: 1 days | End: 2019-09-03
Payer: MEDICAID

## 2019-09-03 ENCOUNTER — APPOINTMENT (OUTPATIENT)
Dept: RADIOLOGY | Facility: IMAGING CENTER | Age: 54
End: 2019-09-03
Payer: MEDICAID

## 2019-09-03 ENCOUNTER — APPOINTMENT (OUTPATIENT)
Dept: MAMMOGRAPHY | Facility: IMAGING CENTER | Age: 54
End: 2019-09-03
Payer: MEDICAID

## 2019-09-03 DIAGNOSIS — Z00.00 ENCOUNTER FOR GENERAL ADULT MEDICAL EXAMINATION WITHOUT ABNORMAL FINDINGS: ICD-10-CM

## 2019-09-03 DIAGNOSIS — M32.9 SYSTEMIC LUPUS ERYTHEMATOSUS, UNSPECIFIED: ICD-10-CM

## 2019-09-03 LAB
CYTOLOGY SPEC DOC CYTO: SIGNIFICANT CHANGE UP
SMOOTH MUSCLE AB SER-ACNC: SIGNIFICANT CHANGE UP

## 2019-09-03 PROCEDURE — 77080 DXA BONE DENSITY AXIAL: CPT | Mod: 26

## 2019-09-03 PROCEDURE — 77063 BREAST TOMOSYNTHESIS BI: CPT | Mod: 26

## 2019-09-03 PROCEDURE — 77067 SCR MAMMO BI INCL CAD: CPT

## 2019-09-03 PROCEDURE — 77063 BREAST TOMOSYNTHESIS BI: CPT

## 2019-09-03 PROCEDURE — 77080 DXA BONE DENSITY AXIAL: CPT

## 2019-09-03 PROCEDURE — 77067 SCR MAMMO BI INCL CAD: CPT | Mod: 26

## 2019-09-05 ENCOUNTER — RESULT REVIEW (OUTPATIENT)
Age: 54
End: 2019-09-05

## 2019-09-06 ENCOUNTER — OUTPATIENT (OUTPATIENT)
Dept: OUTPATIENT SERVICES | Facility: HOSPITAL | Age: 54
LOS: 1 days | End: 2019-09-06
Payer: MEDICAID

## 2019-09-06 ENCOUNTER — APPOINTMENT (OUTPATIENT)
Dept: ULTRASOUND IMAGING | Facility: IMAGING CENTER | Age: 54
End: 2019-09-06
Payer: MEDICAID

## 2019-09-06 DIAGNOSIS — M25.50 PAIN IN UNSPECIFIED JOINT: ICD-10-CM

## 2019-09-06 PROCEDURE — 76830 TRANSVAGINAL US NON-OB: CPT

## 2019-09-06 PROCEDURE — 76830 TRANSVAGINAL US NON-OB: CPT | Mod: 26

## 2019-09-16 NOTE — PLAN
[FreeTextEntry1] : \par HCM\par - Due for Bone density scan (last 2016 demonstrated osteopenia; T score -1.9)\par - Colonoscopy up to date (Next 2024)\par - Mammogram referral\par - GYN referral for annual exam and establish care\par - Tetanus shot today\par - Labs from 8/1/19 reviewed\par - Order additional A1c, SMA, B12, Lipid panel, Ferritin level (as per psych request)\par - EKG today\par - Due for dental cleaning\par - Eye exam up to date\par - Return in 6 months for followup visit\par \par Dry eyes\par - Patient seen by ophtho - recommended to buy Lacrilube and artificial tears- too expensive for pt OTC - will give rx \par SLED\par - C/w cellcept\par - F/u with rheum as directed\par \par Dysphagia/Abdominal pain\par - Return to GI as scheduled for EGD\par \par Depression\par - c/w Sertraline as directed by Psychiatry\par - Return in 6 months for followup visit\par

## 2019-09-16 NOTE — HISTORY OF PRESENT ILLNESS
[de-identified] : 54F PMH SLE, lupus nephritis (on CellCept), anxiety, depression, hypothyroidism who is presenting for CPE\par \par Patient follows closely with Rheum (last visit was 8/1/19);\par \par Of note, patient with History of Distal Esophageal Stricture - likely due to GERD. Not noted on last EGD in 12/2016; Scheduled for EGD w/ Bravo w/ Dr. Marie to evaluate role of acid in pt's sx. Patient currently following with GI.\par \par Patient also with hx of chronic intermittent headaches (stable); follows with neurology.\par \par She continues with intermittent polyarthralgias and myalgias, especially in the legs and hands. However, manages the pain with tylenol. \par \par Patient is currently on Sertraline for depression - follows with psychiatry team. Planned to have Sertraline dose increased next visit and therefore psych is requesting lab work, CPE, and EKG. She also takes topiramate but does not know dose. \par  [FreeTextEntry1] : CPE

## 2019-09-16 NOTE — ASSESSMENT
[FreeTextEntry1] : 54F PMH SLE, lupus nephritis (on CellCept), anxiety, depression, hypothyroidism who is presenting for CPE

## 2019-09-16 NOTE — PHYSICAL EXAM
[Normal] : normal gait, coordination grossly intact, no focal deficits [de-identified] : Declined - will follow with GYN [de-identified] : Declined - will follow with GYN [de-identified] : +right first digit with arthritic changes; no joint tenderness

## 2019-09-17 ENCOUNTER — APPOINTMENT (OUTPATIENT)
Dept: GASTROENTEROLOGY | Facility: HOSPITAL | Age: 54
End: 2019-09-17

## 2019-09-17 VITALS
HEIGHT: 64 IN | DIASTOLIC BLOOD PRESSURE: 76 MMHG | WEIGHT: 112 LBS | BODY MASS INDEX: 19.12 KG/M2 | SYSTOLIC BLOOD PRESSURE: 111 MMHG | HEART RATE: 69 BPM

## 2019-09-18 ENCOUNTER — OUTPATIENT (OUTPATIENT)
Dept: OUTPATIENT SERVICES | Facility: HOSPITAL | Age: 54
LOS: 1 days | End: 2019-09-18
Payer: MEDICAID

## 2019-09-18 ENCOUNTER — RESULT REVIEW (OUTPATIENT)
Age: 54
End: 2019-09-18

## 2019-09-18 DIAGNOSIS — R10.9 UNSPECIFIED ABDOMINAL PAIN: ICD-10-CM

## 2019-09-18 DIAGNOSIS — R12 HEARTBURN: ICD-10-CM

## 2019-09-18 PROCEDURE — 91035 G-ESOPH REFLX TST W/ELECTROD: CPT | Mod: 26

## 2019-09-18 PROCEDURE — 88305 TISSUE EXAM BY PATHOLOGIST: CPT

## 2019-09-18 PROCEDURE — 88305 TISSUE EXAM BY PATHOLOGIST: CPT | Mod: 26

## 2019-09-18 PROCEDURE — 88312 SPECIAL STAINS GROUP 1: CPT | Mod: 26

## 2019-09-18 PROCEDURE — 88312 SPECIAL STAINS GROUP 1: CPT

## 2019-09-18 PROCEDURE — 43239 EGD BIOPSY SINGLE/MULTIPLE: CPT

## 2019-09-23 ENCOUNTER — APPOINTMENT (OUTPATIENT)
Dept: RHEUMATOLOGY | Facility: CLINIC | Age: 54
End: 2019-09-23
Payer: MEDICAID

## 2019-09-23 VITALS
HEIGHT: 64 IN | OXYGEN SATURATION: 97 % | HEART RATE: 76 BPM | WEIGHT: 115 LBS | DIASTOLIC BLOOD PRESSURE: 77 MMHG | SYSTOLIC BLOOD PRESSURE: 112 MMHG | TEMPERATURE: 98.9 F | BODY MASS INDEX: 19.63 KG/M2

## 2019-09-23 DIAGNOSIS — M54.5 LOW BACK PAIN: ICD-10-CM

## 2019-09-23 PROCEDURE — 90732 PPSV23 VACC 2 YRS+ SUBQ/IM: CPT

## 2019-09-23 PROCEDURE — 99214 OFFICE O/P EST MOD 30 MIN: CPT | Mod: 25

## 2019-09-23 PROCEDURE — G0009: CPT

## 2019-09-24 LAB
25(OH)D3 SERPL-MCNC: 32.4 NG/ML
ALBUMIN SERPL ELPH-MCNC: 4.2 G/DL
ALP BLD-CCNC: 74 U/L
ALT SERPL-CCNC: 8 U/L
ANION GAP SERPL CALC-SCNC: 11 MMOL/L
APPEARANCE: CLEAR
AST SERPL-CCNC: 15 U/L
BACTERIA: NEGATIVE
BASOPHILS # BLD AUTO: 0.01 K/UL
BASOPHILS NFR BLD AUTO: 0.2 %
BILIRUB SERPL-MCNC: 0.5 MG/DL
BILIRUBIN URINE: NEGATIVE
BLOOD URINE: NEGATIVE
BUN SERPL-MCNC: 18 MG/DL
C3 SERPL-MCNC: 114 MG/DL
C4 SERPL-MCNC: 28 MG/DL
CALCIUM SERPL-MCNC: 9.3 MG/DL
CHLORIDE SERPL-SCNC: 106 MMOL/L
CK SERPL-CCNC: 60 U/L
CO2 SERPL-SCNC: 25 MMOL/L
COLOR: YELLOW
CREAT SERPL-MCNC: 1.09 MG/DL
CREAT SPEC-SCNC: 176 MG/DL
CREAT/PROT UR: 0.5 RATIO
ENA RNP AB SER IA-ACNC: 2.4 AL
ENA SM AB SER IA-ACNC: 0.2 AL
ENA SS-A AB SER IA-ACNC: <0.2 AL
ENA SS-B AB SER IA-ACNC: <0.2 AL
EOSINOPHIL # BLD AUTO: 0.01 K/UL
EOSINOPHIL NFR BLD AUTO: 0.2 %
GLUCOSE QUALITATIVE U: NEGATIVE
GLUCOSE SERPL-MCNC: 107 MG/DL
HCT VFR BLD CALC: 36.8 %
HGB BLD-MCNC: 11.7 G/DL
HYALINE CASTS: 1 /LPF
IMM GRANULOCYTES NFR BLD AUTO: 0 %
KETONES URINE: NEGATIVE
LEUKOCYTE ESTERASE URINE: NEGATIVE
LYMPHOCYTES # BLD AUTO: 1.58 K/UL
LYMPHOCYTES NFR BLD AUTO: 31.7 %
MAN DIFF?: NORMAL
MCHC RBC-ENTMCNC: 29.6 PG
MCHC RBC-ENTMCNC: 31.8 GM/DL
MCV RBC AUTO: 93.2 FL
MICROSCOPIC-UA: NORMAL
MONOCYTES # BLD AUTO: 0.61 K/UL
MONOCYTES NFR BLD AUTO: 12.2 %
NEUTROPHILS # BLD AUTO: 2.78 K/UL
NEUTROPHILS NFR BLD AUTO: 55.7 %
NITRITE URINE: NEGATIVE
PH URINE: 6.5
PLATELET # BLD AUTO: 221 K/UL
POTASSIUM SERPL-SCNC: 3.9 MMOL/L
PROT SERPL-MCNC: 7 G/DL
PROT UR-MCNC: 82 MG/DL
PROTEIN URINE: ABNORMAL
RBC # BLD: 3.95 M/UL
RBC # FLD: 12.7 %
RED BLOOD CELLS URINE: 1 /HPF
SODIUM SERPL-SCNC: 142 MMOL/L
SPECIFIC GRAVITY URINE: 1.02
SQUAMOUS EPITHELIAL CELLS: 1 /HPF
TSH SERPL-ACNC: 3.42 UIU/ML
UROBILINOGEN URINE: NORMAL
WBC # FLD AUTO: 4.99 K/UL
WHITE BLOOD CELLS URINE: 1 /HPF

## 2019-09-25 LAB — DSDNA AB SER-ACNC: <12 IU/ML

## 2019-09-27 ENCOUNTER — OTHER (OUTPATIENT)
Age: 54
End: 2019-09-27

## 2019-10-04 ENCOUNTER — OTHER (OUTPATIENT)
Age: 54
End: 2019-10-04

## 2019-10-15 ENCOUNTER — APPOINTMENT (OUTPATIENT)
Dept: GASTROENTEROLOGY | Facility: HOSPITAL | Age: 54
End: 2019-10-15
Payer: MEDICAID

## 2019-10-15 ENCOUNTER — OUTPATIENT (OUTPATIENT)
Dept: OUTPATIENT SERVICES | Facility: HOSPITAL | Age: 54
LOS: 1 days | End: 2019-10-15
Payer: MEDICAID

## 2019-10-15 VITALS
HEART RATE: 78 BPM | DIASTOLIC BLOOD PRESSURE: 72 MMHG | SYSTOLIC BLOOD PRESSURE: 110 MMHG | RESPIRATION RATE: 16 BRPM | BODY MASS INDEX: 20.49 KG/M2 | HEIGHT: 64 IN | WEIGHT: 120 LBS

## 2019-10-15 DIAGNOSIS — K31.9 DISEASE OF STOMACH AND DUODENUM, UNSPECIFIED: ICD-10-CM

## 2019-10-15 DIAGNOSIS — R10.9 UNSPECIFIED ABDOMINAL PAIN: ICD-10-CM

## 2019-10-15 DIAGNOSIS — K21.9 GASTRO-ESOPHAGEAL REFLUX DISEASE WITHOUT ESOPHAGITIS: ICD-10-CM

## 2019-10-15 DIAGNOSIS — R12 HEARTBURN: ICD-10-CM

## 2019-10-15 PROCEDURE — G0463: CPT

## 2019-10-15 PROCEDURE — 99213 OFFICE O/P EST LOW 20 MIN: CPT | Mod: GC

## 2019-10-15 NOTE — HISTORY OF PRESENT ILLNESS
[Heartburn] : stable heartburn [de-identified] : 53 female with a history of lupus c/b lupus nephritis, anxiety, depression, hypothyroidism here for f/u LUQ pain, GERD, and h/o dysphagia due to distal esophageal stricture. \par \par Dysphagia: She had dysphagia in 2672-4426 and was found to have a distal esophageal stricture that required intermittent dilations (last in 2016, up to 18 mm). Since then she has reported resolution of dysphagia. \par \par She has endorsed symptoms of heartburn and LUQ pain since 2014, and treated with PPI and H2 blocker. Work-up has included multiple EGDs - she has tested neg for H pylori and eosinophilic esophagitis. She was found to have candida esophagitis in 2015, s/p treatment.\par \par She also had a colonoscopy in 2014 that showed internal hemorrhoids, but no evidence of microscopic colitis.\par \par Gastric emptying study in 2017 was normal. She had neg Ab testing for scleroderma. \par \par She had FOLEY study w/ Dr. Marie. EGD showed Grade B esophagitis, 5cm hiatal hernia, and  gastritis. Path neg for H pylori. BRAVO was positive w/ good symptom correlation, DeMeester score of 32.3. Thought was that hiatal hernia is largely responsible for pt' sx.\par \par Pt re-started her BID PPI w/ improvement in sx, but they still persist. \par

## 2019-10-15 NOTE — PHYSICAL EXAM
[General Appearance - Alert] : alert [General Appearance - Well Nourished] : well nourished [General Appearance - In No Acute Distress] : in no acute distress [General Appearance - Well Developed] : well developed [Sclera] : the sclera and conjunctiva were normal [Outer Ear] : the ears and nose were normal in appearance [Neck Appearance] : the appearance of the neck was normal [] : no respiratory distress [Auscultation Breath Sounds / Voice Sounds] : lungs were clear to auscultation bilaterally [Heart Sounds] : normal S1 and S2 [Bowel Sounds] : normal bowel sounds [Abdomen Soft] : soft [Abdomen Tenderness] : non-tender [Abdomen Mass (___ Cm)] : no abdominal mass palpated [Edema] : there was no peripheral edema [Involuntary Movements] : no involuntary movements were seen [No Focal Deficits] : no focal deficits [Oriented To Time, Place, And Person] : oriented to person, place, and time

## 2019-10-15 NOTE — ASSESSMENT
[FreeTextEntry1] : 53F w/ SLE, hx of dysphagia 2/2 esophageal stricture s/p dilation, and refractory GERD. Found to have large hiatal hernia and positive BRAVO study.\par \par Impression:\par #Refractory GERD - likely due to large hiatal hernia, confirmed reflux on BRAVO pH testing\par #Hiatal hernia\par \par Recommendations:\par - discussed hiatal hernia and explained how it is the likely etiology of her reflux, discussed surgical repair\par - thoracic surgery referral for possible repair of hiatal hernia\par - c/w BID PPI for now\par - RTC 2-3 months

## 2019-10-24 ENCOUNTER — APPOINTMENT (OUTPATIENT)
Dept: THORACIC SURGERY | Facility: CLINIC | Age: 54
End: 2019-10-24
Payer: MEDICAID

## 2019-10-24 VITALS
DIASTOLIC BLOOD PRESSURE: 81 MMHG | HEIGHT: 63 IN | WEIGHT: 118 LBS | OXYGEN SATURATION: 98 % | SYSTOLIC BLOOD PRESSURE: 117 MMHG | RESPIRATION RATE: 16 BRPM | HEART RATE: 65 BPM | BODY MASS INDEX: 20.91 KG/M2

## 2019-10-24 PROCEDURE — 99205 OFFICE O/P NEW HI 60 MIN: CPT

## 2019-10-28 ENCOUNTER — APPOINTMENT (OUTPATIENT)
Dept: RHEUMATOLOGY | Facility: CLINIC | Age: 54
End: 2019-10-28

## 2019-10-28 NOTE — HISTORY OF PRESENT ILLNESS
[FreeTextEntry1] : Ms. VALERIA PERRY, 54 year old female, never smoker, w/ hx of Lupus and Lupus nephritis, who presented to PCP c/o abdominal pain, on/off acid reflux (7/10) w/ mild relief w/ Omeprazole.\par \par EGD w/ BRAVO pH capsule placement on 9/18/19 by Dr. Papa Marie showed LA Grade B esophagitis w/out bleeding in the lower third esophagus; a 5cm hiatal hernia Hill Grade IV. \par +BRAVO Study: DeMeester Score = 32.3 (normal <14.7) w/ good symptom correlation.\par \par Patient is here today for CT Sx consultation, referred by Dr. Enoc Barclay. \par

## 2019-10-28 NOTE — CONSULT LETTER
[Consult Letter:] : I had the pleasure of evaluating your patient, [unfilled]. [Dear  ___] : Dear  [unfilled], [( Thank you for referring [unfilled] for consultation for _____ )] : Thank you for referring [unfilled] for consultation for [unfilled] [Please see my note below.] : Please see my note below. [Consult Closing:] : Thank you very much for allowing me to participate in the care of this patient.  If you have any questions, please do not hesitate to contact me. [Sincerely,] : Sincerely, [DrJewell  ___] : Dr. HILL [DrJewell ___] : Dr. HILL [FreeTextEntry2] : Enoc Barclay MD (GI)\par Papa Marie MD (GI Motility)\par Tin Billingsley MD (Hem/Onc)\par Lilian Kumar MD (PCP) [FreeTextEntry3] : Armani Lowry MD, MPH \par System Director of Thoracic Surgery \par Director of Comprehensive Lung and Foregut [place] \par Professor Cardiovascular & Thoracic Surgery  \par [facility] [employer] at [facility]\par

## 2019-10-28 NOTE — DATA REVIEWED
[FreeTextEntry1] : EGD w/ BRAVO pH capsule placement on 9/18/19 by Dr. Papa Marie showed LA Grade B esophagitis w/out bleeding in the lower third esophagus; a 5cm hiatal hernia Hill Grade IV. \par +BRAVO Study: DeMeester Score = 32.3 (normal <14.7) w/ good symptom correlation.

## 2019-10-28 NOTE — PHYSICAL EXAM
[General Appearance - Alert] : alert [General Appearance - In No Acute Distress] : in no acute distress [Sclera] : the sclera and conjunctiva were normal [PERRL With Normal Accommodation] : pupils were equal in size, round, and reactive to light [Extraocular Movements] : extraocular movements were intact [Outer Ear] : the ears and nose were normal in appearance [Oropharynx] : the oropharynx was normal [Neck Appearance] : the appearance of the neck was normal [Neck Cervical Mass (___cm)] : no neck mass was observed [Jugular Venous Distention Increased] : there was no jugular-venous distention [Thyroid Diffuse Enlargement] : the thyroid was not enlarged [Thyroid Nodule] : there were no palpable thyroid nodules [Auscultation Breath Sounds / Voice Sounds] : lungs were clear to auscultation bilaterally [Heart Rate And Rhythm] : heart rate was normal and rhythm regular [Heart Sounds] : normal S1 and S2 [Heart Sounds Gallop] : no gallops [Murmurs] : no murmurs [Heart Sounds Pericardial Friction Rub] : no pericardial rub [Examination Of The Chest] : the chest was normal in appearance [Chest Visual Inspection Thoracic Asymmetry] : no chest asymmetry [Diminished Respiratory Excursion] : normal chest expansion [2+] : left 2+ [Breast Appearance] : normal in appearance [Breast Palpation Mass] : no palpable masses [Bowel Sounds] : normal bowel sounds [Abdomen Tenderness] : non-tender [Abdomen Soft] : soft [Abdomen Mass (___ Cm)] : no abdominal mass palpated [Cervical Lymph Nodes Enlarged Posterior Bilaterally] : posterior cervical [Cervical Lymph Nodes Enlarged Anterior Bilaterally] : anterior cervical [Supraclavicular Lymph Nodes Enlarged Bilaterally] : supraclavicular [No CVA Tenderness] : no ~M costovertebral angle tenderness [No Spinal Tenderness] : no spinal tenderness [Abnormal Walk] : normal gait [Nail Clubbing] : no clubbing  or cyanosis of the fingernails [Musculoskeletal - Swelling] : no joint swelling seen [Motor Tone] : muscle strength and tone were normal [Skin Color & Pigmentation] : normal skin color and pigmentation [Skin Turgor] : normal skin turgor [] : no rash [Deep Tendon Reflexes (DTR)] : deep tendon reflexes were 2+ and symmetric [Sensation] : the sensory exam was normal to light touch and pinprick [No Focal Deficits] : no focal deficits [Oriented To Time, Place, And Person] : oriented to person, place, and time [Impaired Insight] : insight and judgment were intact [Affect] : the affect was normal [FreeTextEntry1] : deferred

## 2019-10-28 NOTE — ASSESSMENT
[FreeTextEntry1] : Ms. VALERIA PERRY, 54 year old female, never smoker, w/ hx of Lupus and Lupus nephritis, who presented to PCP c/o abdominal pain, on/off acid reflux (7/10) w/ mild relief w/ Omeprazole.\par \par EGD w/ BRAVO pH capsule placement on 9/18/19 by Dr. Papa Marie showed LA Grade B esophagitis w/out bleeding in the lower third esophagus; a 5cm hiatal hernia Hill Grade IV. \par +BRAVO Study: DeMeester Score = 32.3 (normal <14.7) w/ good symptom correlation.\par \par I have reviewed the patient's medical records and diagnostic images at time of this office consultation and have made the following recommendation:\par 1. Barium esophagram\par 2. Manometry Study\par 3. CT Chest w/out contrast\par 4. I recommended an EGD Laparoscopy, Robotic-assisted, repair of hiatal hernia, fundoplication on 11/11/19. Risks and benefits and alternatives explained to patient, all questions answered, patient agreed to proceed with surgery.\par 5. Medical clearance and PST\par \par \par Written by Mickie Yu NP, acting as a scribe for Dr. Armani Lowry.\par \par The documentation recorded by the scribe accurately reflects the service I personally performed and the decisions made by me. ARMANI LOWRY MD\par

## 2019-10-29 ENCOUNTER — FORM ENCOUNTER (OUTPATIENT)
Age: 54
End: 2019-10-29

## 2019-10-30 ENCOUNTER — APPOINTMENT (OUTPATIENT)
Dept: CT IMAGING | Facility: CLINIC | Age: 54
End: 2019-10-30
Payer: MEDICAID

## 2019-10-30 ENCOUNTER — OUTPATIENT (OUTPATIENT)
Dept: OUTPATIENT SERVICES | Facility: HOSPITAL | Age: 54
LOS: 1 days | End: 2019-10-30
Payer: MEDICAID

## 2019-10-30 DIAGNOSIS — K44.9 DIAPHRAGMATIC HERNIA WITHOUT OBSTRUCTION OR GANGRENE: ICD-10-CM

## 2019-10-30 PROCEDURE — 71250 CT THORAX DX C-: CPT | Mod: 26

## 2019-10-30 PROCEDURE — 71250 CT THORAX DX C-: CPT

## 2019-11-05 ENCOUNTER — OUTPATIENT (OUTPATIENT)
Dept: OUTPATIENT SERVICES | Facility: HOSPITAL | Age: 54
LOS: 1 days | End: 2019-11-05
Payer: MEDICAID

## 2019-11-05 VITALS
TEMPERATURE: 98 F | WEIGHT: 117.95 LBS | HEIGHT: 60 IN | HEART RATE: 66 BPM | RESPIRATION RATE: 16 BRPM | OXYGEN SATURATION: 98 % | DIASTOLIC BLOOD PRESSURE: 70 MMHG | SYSTOLIC BLOOD PRESSURE: 106 MMHG

## 2019-11-05 DIAGNOSIS — K44.9 DIAPHRAGMATIC HERNIA WITHOUT OBSTRUCTION OR GANGRENE: ICD-10-CM

## 2019-11-05 DIAGNOSIS — E03.9 HYPOTHYROIDISM, UNSPECIFIED: ICD-10-CM

## 2019-11-05 LAB
ANION GAP SERPL CALC-SCNC: 10 MMO/L — SIGNIFICANT CHANGE UP (ref 7–14)
BLD GP AB SCN SERPL QL: NEGATIVE — SIGNIFICANT CHANGE UP
BUN SERPL-MCNC: 24 MG/DL — HIGH (ref 7–23)
CALCIUM SERPL-MCNC: 9.8 MG/DL — SIGNIFICANT CHANGE UP (ref 8.4–10.5)
CHLORIDE SERPL-SCNC: 105 MMOL/L — SIGNIFICANT CHANGE UP (ref 98–107)
CO2 SERPL-SCNC: 25 MMOL/L — SIGNIFICANT CHANGE UP (ref 22–31)
CREAT SERPL-MCNC: 1.14 MG/DL — SIGNIFICANT CHANGE UP (ref 0.5–1.3)
GLUCOSE SERPL-MCNC: 95 MG/DL — SIGNIFICANT CHANGE UP (ref 70–99)
HCT VFR BLD CALC: 35.1 % — SIGNIFICANT CHANGE UP (ref 34.5–45)
HGB BLD-MCNC: 11.5 G/DL — SIGNIFICANT CHANGE UP (ref 11.5–15.5)
MCHC RBC-ENTMCNC: 29.9 PG — SIGNIFICANT CHANGE UP (ref 27–34)
MCHC RBC-ENTMCNC: 32.8 % — SIGNIFICANT CHANGE UP (ref 32–36)
MCV RBC AUTO: 91.4 FL — SIGNIFICANT CHANGE UP (ref 80–100)
NRBC # FLD: 0 K/UL — SIGNIFICANT CHANGE UP (ref 0–0)
PLATELET # BLD AUTO: 237 K/UL — SIGNIFICANT CHANGE UP (ref 150–400)
PMV BLD: 9.9 FL — SIGNIFICANT CHANGE UP (ref 7–13)
POTASSIUM SERPL-MCNC: 4.5 MMOL/L — SIGNIFICANT CHANGE UP (ref 3.5–5.3)
POTASSIUM SERPL-SCNC: 4.5 MMOL/L — SIGNIFICANT CHANGE UP (ref 3.5–5.3)
RBC # BLD: 3.84 M/UL — SIGNIFICANT CHANGE UP (ref 3.8–5.2)
RBC # FLD: 13.2 % — SIGNIFICANT CHANGE UP (ref 10.3–14.5)
RH IG SCN BLD-IMP: POSITIVE — SIGNIFICANT CHANGE UP
SODIUM SERPL-SCNC: 140 MMOL/L — SIGNIFICANT CHANGE UP (ref 135–145)
WBC # BLD: 4.61 K/UL — SIGNIFICANT CHANGE UP (ref 3.8–10.5)
WBC # FLD AUTO: 4.61 K/UL — SIGNIFICANT CHANGE UP (ref 3.8–10.5)

## 2019-11-05 PROCEDURE — 93010 ELECTROCARDIOGRAM REPORT: CPT

## 2019-11-05 RX ORDER — OMEPRAZOLE 10 MG/1
1 CAPSULE, DELAYED RELEASE ORAL
Qty: 0 | Refills: 0 | DISCHARGE

## 2019-11-05 RX ORDER — PREGABALIN 225 MG/1
1 CAPSULE ORAL
Qty: 0 | Refills: 0 | DISCHARGE

## 2019-11-05 RX ORDER — MYCOPHENOLATE MOFETIL 250 MG/1
5 CAPSULE ORAL
Qty: 0 | Refills: 0 | DISCHARGE

## 2019-11-05 RX ORDER — LEVOTHYROXINE SODIUM 125 MCG
1 TABLET ORAL
Qty: 0 | Refills: 0 | DISCHARGE

## 2019-11-05 NOTE — H&P PST ADULT - NSICDXPASTMEDICALHX_GEN_ALL_CORE_FT
PAST MEDICAL HISTORY:  Depression     GERD (Gastroesophageal Reflux Disease)     Hypothyroidism     Lupus (Systemic Lupus Erythematosus)     Migraine     Nephritis     Pericarditis 2001    Vertigo

## 2019-11-05 NOTE — H&P PST ADULT - LYMPHATIC
posterior cervical L/supraclavicular R/anterior cervical L/anterior cervical R/posterior cervical R/supraclavicular L

## 2019-11-05 NOTE — H&P PST ADULT - NSICDXPROBLEM_GEN_ALL_CORE_FT
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     Problem: Hypothyroidism  Assessment and Plan: pt instructed to take levothyroxine day of surgery .

## 2019-11-05 NOTE — H&P PST ADULT - RS GEN PE MLT RESP DETAILS PC
good air movement/clear to auscultation bilaterally/no rhonchi/breath sounds equal/respirations non-labored/no rales/no wheezes

## 2019-11-05 NOTE — H&P PST ADULT - HISTORY OF PRESENT ILLNESS
This is a 54 y.o. female complaining of epigastric discomfort , evaluated . Pt had endoscopy . Pt has diaphragmatic hernia without obstruction or gangrene . Pt now for surgery .

## 2019-11-11 ENCOUNTER — OTHER (OUTPATIENT)
Age: 54
End: 2019-11-11

## 2019-11-13 ENCOUNTER — OUTPATIENT (OUTPATIENT)
Dept: OUTPATIENT SERVICES | Facility: HOSPITAL | Age: 54
LOS: 1 days | Discharge: HOME | End: 2019-11-13
Payer: MEDICAID

## 2019-11-13 ENCOUNTER — APPOINTMENT (OUTPATIENT)
Dept: GASTROENTEROLOGY | Facility: HOSPITAL | Age: 54
End: 2019-11-13

## 2019-11-13 VITALS
DIASTOLIC BLOOD PRESSURE: 68 MMHG | WEIGHT: 117.95 LBS | OXYGEN SATURATION: 98 % | TEMPERATURE: 97 F | HEIGHT: 63 IN | SYSTOLIC BLOOD PRESSURE: 112 MMHG | HEART RATE: 64 BPM | RESPIRATION RATE: 16 BRPM

## 2019-11-13 DIAGNOSIS — K44.9 DIAPHRAGMATIC HERNIA WITHOUT OBSTRUCTION OR GANGRENE: ICD-10-CM

## 2019-11-13 PROCEDURE — 91037 ESOPH IMPED FUNCTION TEST: CPT | Mod: 26,GC

## 2019-11-13 PROCEDURE — 91010 ESOPHAGUS MOTILITY STUDY: CPT | Mod: 26,GC

## 2019-11-13 NOTE — ASU PATIENT PROFILE, ADULT - PMH
Depression    GERD (Gastroesophageal Reflux Disease)    Hypothyroidism    Lupus (Systemic Lupus Erythematosus)    Migraine    Nephritis    Pericarditis  2001  Vertigo

## 2019-11-14 ENCOUNTER — CLINICAL ADVICE (OUTPATIENT)
Age: 54
End: 2019-11-14

## 2019-11-17 ENCOUNTER — FORM ENCOUNTER (OUTPATIENT)
Age: 54
End: 2019-11-17

## 2019-11-18 ENCOUNTER — OUTPATIENT (OUTPATIENT)
Dept: OUTPATIENT SERVICES | Facility: HOSPITAL | Age: 54
LOS: 1 days | End: 2019-11-18

## 2019-11-18 ENCOUNTER — APPOINTMENT (OUTPATIENT)
Dept: RADIOLOGY | Facility: HOSPITAL | Age: 54
End: 2019-11-18
Payer: MEDICAID

## 2019-11-18 ENCOUNTER — APPOINTMENT (OUTPATIENT)
Dept: THORACIC SURGERY | Facility: CLINIC | Age: 54
End: 2019-11-18
Payer: MEDICAID

## 2019-11-18 VITALS
HEART RATE: 59 BPM | WEIGHT: 118 LBS | SYSTOLIC BLOOD PRESSURE: 118 MMHG | DIASTOLIC BLOOD PRESSURE: 81 MMHG | OXYGEN SATURATION: 98 % | RESPIRATION RATE: 16 BRPM | BODY MASS INDEX: 20.9 KG/M2

## 2019-11-18 DIAGNOSIS — K44.9 DIAPHRAGMATIC HERNIA WITHOUT OBSTRUCTION OR GANGRENE: ICD-10-CM

## 2019-11-18 PROCEDURE — 99214 OFFICE O/P EST MOD 30 MIN: CPT

## 2019-11-18 PROCEDURE — 74220 X-RAY XM ESOPHAGUS 1CNTRST: CPT | Mod: 26

## 2019-11-18 NOTE — PHYSICAL EXAM
[Auscultation Breath Sounds / Voice Sounds] : lungs were clear to auscultation bilaterally [Heart Rate And Rhythm] : heart rate was normal and rhythm regular [Heart Sounds] : normal S1 and S2 [Heart Sounds Gallop] : no gallops [Murmurs] : no murmurs [Heart Sounds Pericardial Friction Rub] : no pericardial rub [Examination Of The Chest] : the chest was normal in appearance [Chest Visual Inspection Thoracic Asymmetry] : no chest asymmetry [Diminished Respiratory Excursion] : normal chest expansion [Bowel Sounds] : normal bowel sounds [Abdomen Soft] : soft [Abdomen Tenderness] : non-tender [Abdomen Mass (___ Cm)] : no abdominal mass palpated [Skin Color & Pigmentation] : normal skin color and pigmentation [Skin Turgor] : normal skin turgor [] : no rash [Deep Tendon Reflexes (DTR)] : deep tendon reflexes were 2+ and symmetric [Sensation] : the sensory exam was normal to light touch and pinprick [No Focal Deficits] : no focal deficits [Oriented To Time, Place, And Person] : oriented to person, place, and time [Impaired Insight] : insight and judgment were intact [Affect] : the affect was normal

## 2019-11-19 NOTE — DATA REVIEWED
[FreeTextEntry1] : CT Chest on 10/30/19:\par - a 3mm NICOLE nodule (4:86)\par - small Rt posterolateral tracheal diverticulum\par \par Manometry study on 11/13/19 by Dr. Susan Edwards. LES = 11 (13-43), UES = 61.4 (). 100% swallows w/ absent peristalsis, 100% swallows w/ incomplete bolus clearance by impedence analysis. A 2.7cm hiatal hernia.

## 2019-11-19 NOTE — ASSESSMENT
[FreeTextEntry1] : Ms. VALERIA PERRY, 54 year old female, never smoker, w/ hx of Lupus and Lupus nephritis, who presented to PCP c/o abdominal pain, on/off acid reflux (7/10) w/ mild relief w/ Omeprazole.\par \par EGD w/ BRAVO pH capsule placement on 9/18/19 by Dr. Papa Marie showed LA Grade B esophagitis w/out bleeding in the lower third esophagus; a 5cm hiatal hernia Hill Grade IV. \par +BRAVO Study: DeMeester Score = 32.3 (normal <14.7) w/ good symptom correlation.\par \par CT Chest on 10/30/19:\par - a 3mm NICOLE nodule (4:86)\par - small Rt posterolateral tracheal diverticulum\par \par Manometry study on 11/13/19 by Dr. Susan Edwards. LES = 11 (13-43), UES = 61.4 (). 100% swallows w/ absent peristalsis, 100% swallows w/ incomplete bolus clearance by impedence analysis. A 2.7cm hiatal hernia.  \par \par I have reviewed the patient's medical records and diagnostic images at time of this office consultation and have made the following recommendation:\par 1. Manometry reviewed and explained to patient in details, 100% swallows with absent peristalsis, therefore fundoplication is not an option for patient because it will cause more dysphagia. Scheduled surgery canceled.\par 2. Continue PPIs, add Carafate, and diet modification (avoid alcohol and acidic food)\par 3. RTC in 3mo for f/u\par \par \par Written by Mickie Yu NP, acting as a scribe for Dr. Armani Lowry.\par \par The documentation recorded by the scribe accurately reflects the service I personally performed and the decisions made by me. ARMANI LOWRY MD\par

## 2019-11-19 NOTE — CONSULT LETTER
[Dear  ___] : Dear  [unfilled], [Consult Letter:] : I had the pleasure of evaluating your patient, [unfilled]. [( Thank you for referring [unfilled] for consultation for _____ )] : Thank you for referring [unfilled] for consultation for [unfilled] [Consult Closing:] : Thank you very much for allowing me to participate in the care of this patient.  If you have any questions, please do not hesitate to contact me. [Please see my note below.] : Please see my note below. [Sincerely,] : Sincerely, [DrJewell  ___] : Dr. HILL [DrJewell ___] : Dr. HILL [FreeTextEntry2] : Enoc Barclay MD (GI)\par Papa Marie MD (GI Motility)\par Tin Billingsley MD (Hem/Onc)\par Lilian Kumar MD (PCP)  [FreeTextEntry3] : Armani Lowry MD, MPH \par System Director of Thoracic Surgery \par Director of Comprehensive Lung and Foregut [place] \par Professor Cardiovascular & Thoracic Surgery  \par [facility] [employer] at [facility]\par

## 2019-11-19 NOTE — HISTORY OF PRESENT ILLNESS
[FreeTextEntry1] : Ms. VALERIA PERRY, 54 year old female, never smoker, w/ hx of Lupus and Lupus nephritis, who presented to PCP c/o abdominal pain, on/off acid reflux (7/10) w/ mild relief w/ Omeprazole.\par \par EGD w/ BRAVO pH capsule placement on 9/18/19 by Dr. Papa Marie showed LA Grade B esophagitis w/out bleeding in the lower third esophagus; a 5cm hiatal hernia Hill Grade IV. \par +BRAVO Study: DeMeester Score = 32.3 (normal <14.7) w/ good symptom correlation.\par \par CT Chest on 10/30/19:\par - a 3mm NICOLE nodule (4:86)\par - small Rt posterolateral tracheal diverticulum\par \par Manometry study on 11/13/19 by Dr. Susan Edwards. LES = 11 (13-43), UES = 61.4 (). 100% swallows w/ absent peristalsis, 100% swallows w/ incomplete bolus clearance by impedence analysis. A 2.7cm hiatal hernia.  \par \par Barium Esophagram on 11/18/19. Report pending.\par \par Patient is here today for a follow up. Admits to acid reflux, taking Omeprazole BID w/ mild relief, using many pillows when sleeping, and sharp epigastric pain after eating (worse w/ liquids).

## 2019-11-20 ENCOUNTER — APPOINTMENT (OUTPATIENT)
Dept: THORACIC SURGERY | Facility: HOSPITAL | Age: 54
End: 2019-11-20

## 2019-11-25 ENCOUNTER — APPOINTMENT (OUTPATIENT)
Dept: RHEUMATOLOGY | Facility: CLINIC | Age: 54
End: 2019-11-25
Payer: MEDICAID

## 2019-11-25 VITALS
TEMPERATURE: 98.5 F | WEIGHT: 120 LBS | HEIGHT: 63 IN | OXYGEN SATURATION: 97 % | HEART RATE: 65 BPM | SYSTOLIC BLOOD PRESSURE: 121 MMHG | DIASTOLIC BLOOD PRESSURE: 78 MMHG | BODY MASS INDEX: 21.26 KG/M2

## 2019-11-25 DIAGNOSIS — Z87.440 PERSONAL HISTORY OF URINARY (TRACT) INFECTIONS: ICD-10-CM

## 2019-11-25 PROCEDURE — 99214 OFFICE O/P EST MOD 30 MIN: CPT

## 2019-11-26 LAB
25(OH)D3 SERPL-MCNC: 36.9 NG/ML
ALBUMIN SERPL ELPH-MCNC: 4 G/DL
ALP BLD-CCNC: 74 U/L
ALT SERPL-CCNC: 5 U/L
ANION GAP SERPL CALC-SCNC: 14 MMOL/L
APPEARANCE: CLEAR
AST SERPL-CCNC: 16 U/L
BACTERIA: NEGATIVE
BASOPHILS # BLD AUTO: 0.02 K/UL
BASOPHILS NFR BLD AUTO: 0.4 %
BILIRUB SERPL-MCNC: 0.4 MG/DL
BILIRUBIN URINE: NEGATIVE
BLOOD URINE: NEGATIVE
BUN SERPL-MCNC: 23 MG/DL
C3 SERPL-MCNC: 107 MG/DL
C4 SERPL-MCNC: 23 MG/DL
CALCIUM SERPL-MCNC: 9.4 MG/DL
CHLORIDE SERPL-SCNC: 105 MMOL/L
CK SERPL-CCNC: 59 U/L
CO2 SERPL-SCNC: 21 MMOL/L
COLOR: COLORLESS
CREAT SERPL-MCNC: 1.16 MG/DL
CREAT SPEC-SCNC: 45 MG/DL
CREAT/PROT UR: 0.5 RATIO
EOSINOPHIL # BLD AUTO: 0.03 K/UL
EOSINOPHIL NFR BLD AUTO: 0.6 %
GLUCOSE QUALITATIVE U: NEGATIVE
GLUCOSE SERPL-MCNC: 82 MG/DL
HCT VFR BLD CALC: 36.7 %
HGB BLD-MCNC: 11.7 G/DL
HYALINE CASTS: 0 /LPF
IMM GRANULOCYTES NFR BLD AUTO: 0.4 %
KETONES URINE: NEGATIVE
LEUKOCYTE ESTERASE URINE: NEGATIVE
LYMPHOCYTES # BLD AUTO: 1.85 K/UL
LYMPHOCYTES NFR BLD AUTO: 35.9 %
MAN DIFF?: NORMAL
MCHC RBC-ENTMCNC: 29.9 PG
MCHC RBC-ENTMCNC: 31.9 GM/DL
MCV RBC AUTO: 93.9 FL
MICROSCOPIC-UA: NORMAL
MONOCYTES # BLD AUTO: 0.53 K/UL
MONOCYTES NFR BLD AUTO: 10.3 %
NEUTROPHILS # BLD AUTO: 2.71 K/UL
NEUTROPHILS NFR BLD AUTO: 52.4 %
NITRITE URINE: NEGATIVE
PH URINE: 6.5
PLATELET # BLD AUTO: 214 K/UL
POTASSIUM SERPL-SCNC: 4.7 MMOL/L
PROT SERPL-MCNC: 6.9 G/DL
PROT UR-MCNC: 21 MG/DL
PROTEIN URINE: NORMAL
RBC # BLD: 3.91 M/UL
RBC # FLD: 13.2 %
RED BLOOD CELLS URINE: 1 /HPF
SODIUM SERPL-SCNC: 140 MMOL/L
SPECIFIC GRAVITY URINE: 1.01
SQUAMOUS EPITHELIAL CELLS: 0 /HPF
TSH SERPL-ACNC: 2 UIU/ML
UROBILINOGEN URINE: NORMAL
WBC # FLD AUTO: 5.16 K/UL
WHITE BLOOD CELLS URINE: 0 /HPF

## 2019-11-27 LAB — DSDNA AB SER-ACNC: <12 IU/ML

## 2019-12-12 ENCOUNTER — LABORATORY RESULT (OUTPATIENT)
Age: 54
End: 2019-12-12

## 2020-01-21 ENCOUNTER — APPOINTMENT (OUTPATIENT)
Dept: GASTROENTEROLOGY | Facility: HOSPITAL | Age: 55
End: 2020-01-21

## 2020-02-18 ENCOUNTER — APPOINTMENT (OUTPATIENT)
Dept: RHEUMATOLOGY | Facility: CLINIC | Age: 55
End: 2020-02-18
Payer: MEDICAID

## 2020-02-18 VITALS
HEIGHT: 63 IN | TEMPERATURE: 98.01 F | BODY MASS INDEX: 21.26 KG/M2 | HEART RATE: 69 BPM | WEIGHT: 120 LBS | OXYGEN SATURATION: 98 %

## 2020-02-18 PROCEDURE — 99214 OFFICE O/P EST MOD 30 MIN: CPT

## 2020-02-19 LAB
25(OH)D3 SERPL-MCNC: 38.5 NG/ML
ALBUMIN SERPL ELPH-MCNC: 3.9 G/DL
ALP BLD-CCNC: 62 U/L
ALT SERPL-CCNC: 10 U/L
ANION GAP SERPL CALC-SCNC: 15 MMOL/L
APPEARANCE: CLEAR
AST SERPL-CCNC: 15 U/L
BACTERIA: NEGATIVE
BASOPHILS # BLD AUTO: 0.03 K/UL
BASOPHILS NFR BLD AUTO: 0.8 %
BILIRUB SERPL-MCNC: 0.3 MG/DL
BILIRUBIN URINE: NEGATIVE
BLOOD URINE: NEGATIVE
BUN SERPL-MCNC: 20 MG/DL
C3 SERPL-MCNC: 104 MG/DL
C4 SERPL-MCNC: 24 MG/DL
CALCIUM SERPL-MCNC: 9.1 MG/DL
CHLORIDE SERPL-SCNC: 109 MMOL/L
CK SERPL-CCNC: 55 U/L
CO2 SERPL-SCNC: 20 MMOL/L
COLOR: NORMAL
CREAT SERPL-MCNC: 1.21 MG/DL
CREAT SPEC-SCNC: 85 MG/DL
CREAT/PROT UR: 0.8 RATIO
DEPRECATED KAPPA LC FREE/LAMBDA SER: 0.91 RATIO
DSDNA AB SER-ACNC: <12 IU/ML
ENA RNP AB SER IA-ACNC: 2.8 AL
ENA SM AB SER IA-ACNC: 0.2 AL
ENA SS-A AB SER IA-ACNC: 0.3 AL
ENA SS-B AB SER IA-ACNC: <0.2 AL
EOSINOPHIL # BLD AUTO: 0.08 K/UL
EOSINOPHIL NFR BLD AUTO: 2.1 %
GLUCOSE QUALITATIVE U: NEGATIVE
GLUCOSE SERPL-MCNC: 86 MG/DL
HCT VFR BLD CALC: 36.8 %
HGB BLD-MCNC: 11.4 G/DL
HYALINE CASTS: 0 /LPF
IGA SER QL IEP: 444 MG/DL
IGG SER QL IEP: 959 MG/DL
IGM SER QL IEP: 49 MG/DL
IMM GRANULOCYTES NFR BLD AUTO: 0 %
KAPPA LC CSF-MCNC: 2.93 MG/DL
KAPPA LC SERPL-MCNC: 2.68 MG/DL
KETONES URINE: NEGATIVE
LEUKOCYTE ESTERASE URINE: NEGATIVE
LYMPHOCYTES # BLD AUTO: 1.68 K/UL
LYMPHOCYTES NFR BLD AUTO: 43.2 %
MAN DIFF?: NORMAL
MCHC RBC-ENTMCNC: 28.6 PG
MCHC RBC-ENTMCNC: 31 GM/DL
MCV RBC AUTO: 92.5 FL
MICROSCOPIC-UA: NORMAL
MONOCYTES # BLD AUTO: 0.45 K/UL
MONOCYTES NFR BLD AUTO: 11.6 %
NEUTROPHILS # BLD AUTO: 1.65 K/UL
NEUTROPHILS NFR BLD AUTO: 42.3 %
NITRITE URINE: NEGATIVE
PH URINE: 6.5
PLATELET # BLD AUTO: 218 K/UL
POTASSIUM SERPL-SCNC: 4.1 MMOL/L
PROT SERPL-MCNC: 6.3 G/DL
PROT UR-MCNC: 65 MG/DL
PROTEIN URINE: ABNORMAL
RBC # BLD: 3.98 M/UL
RBC # FLD: 13.6 %
RED BLOOD CELLS URINE: 0 /HPF
SODIUM SERPL-SCNC: 145 MMOL/L
SPECIFIC GRAVITY URINE: 1.02
SQUAMOUS EPITHELIAL CELLS: 0 /HPF
TSH SERPL-ACNC: 3.34 UIU/ML
UROBILINOGEN URINE: NORMAL
WBC # FLD AUTO: 3.89 K/UL
WHITE BLOOD CELLS URINE: 0 /HPF

## 2020-02-19 NOTE — HISTORY OF PRESENT ILLNESS
[FreeTextEntry1] : Ms. VALERIA PERRY, 54 year old female, never smoker, w/ hx of Lupus and Lupus nephritis, who presented to PCP c/o abdominal pain, on/off acid reflux (7/10) w/ mild relief w/ Omeprazole.\par \par EGD w/ BRAVO pH capsule placement on 9/18/19 by Dr. Papa Marie showed LA Grade B esophagitis w/out bleeding in the lower third esophagus; a 5cm hiatal hernia Hill Grade IV. \par +BRAVO Study: DeMeester Score = 32.3 (normal <14.7) w/ good symptom correlation.\par \par CT Chest on 10/30/19:\par - a 3mm NICOLE nodule (4:86)\par - small Rt posterolateral tracheal diverticulum\par \par Manometry study on 11/13/19 by Dr. Susan Edwards. LES = 11 (13-43), UES = 61.4 (). 100% swallows w/ absent peristalsis, 100% swallows w/ incomplete bolus clearance by impedence analysis. A 2.7cm hiatal hernia. \par \par Barium Esophagram on 11/18/19:\par - a small diverticulum noted Lt lateral side of esophagus at level of aortic arch\par - a tiny hiatal hernia\par \par Pt presents today for follow up.\par

## 2020-02-19 NOTE — ASSESSMENT
[FreeTextEntry1] : \par \par \par I have reviewed the patient's medical records and diagnostic images at time of this office consultation and have made the following recommendation:\par 1.\par \par \par I personally performed the services described in the documentation, reviewed the documentation recorded by the scribe in my presence and it accurately and completely records my words and actions. \par \par I, Dominga Jean Baptiste NP, am scribing for and the presence of Dr. Armani Lowry the following sections, HISTORY OF PRESENT ILLNESS, PAST MEDICAL/FAMILY/SOCIAL HISTORY; REVIEW OF SYSTEMS; VITAL SIGNS; PHYSICAL EXAM; DISPOSITION.\par

## 2020-02-19 NOTE — CONSULT LETTER
[Dear  ___] : Dear  [unfilled], [( Thank you for referring [unfilled] for consultation for _____ )] : Thank you for referring [unfilled] for consultation for [unfilled] [Consult Letter:] : I had the pleasure of evaluating your patient, [unfilled]. [Please see my note below.] : Please see my note below. [Consult Closing:] : Thank you very much for allowing me to participate in the care of this patient.  If you have any questions, please do not hesitate to contact me. [Sincerely,] : Sincerely, [FreeTextEntry3] : Armani Lowry MD, MPH \par System Director of Thoracic Surgery \par Director of Comprehensive Lung and Foregut [place] \par Professor Cardiovascular & Thoracic Surgery  \par [facility] [employer] [facility]\par  [FreeTextEntry2] : Enoc Barclay MD (GI)\par Papa Marie MD (GI Motility)\par Tin Billingsley MD (Hem/Onc)\par Lilian Kumar MD (PCP)  [DrJewell  ___] : Dr. HILL [DrJewell ___] : Dr. HILL

## 2020-02-20 ENCOUNTER — APPOINTMENT (OUTPATIENT)
Dept: THORACIC SURGERY | Facility: CLINIC | Age: 55
End: 2020-02-20

## 2020-04-21 DIAGNOSIS — Z71.89 OTHER SPECIFIED COUNSELING: ICD-10-CM

## 2020-05-04 ENCOUNTER — APPOINTMENT (OUTPATIENT)
Dept: RHEUMATOLOGY | Facility: CLINIC | Age: 55
End: 2020-05-04

## 2020-05-11 ENCOUNTER — RX RENEWAL (OUTPATIENT)
Age: 55
End: 2020-05-11

## 2020-08-31 ENCOUNTER — APPOINTMENT (OUTPATIENT)
Dept: RHEUMATOLOGY | Facility: CLINIC | Age: 55
End: 2020-08-31
Payer: MEDICAID

## 2020-08-31 VITALS
OXYGEN SATURATION: 98 % | TEMPERATURE: 94.2 F | HEIGHT: 63 IN | BODY MASS INDEX: 21.62 KG/M2 | SYSTOLIC BLOOD PRESSURE: 127 MMHG | DIASTOLIC BLOOD PRESSURE: 84 MMHG | RESPIRATION RATE: 14 BRPM | WEIGHT: 122 LBS | HEART RATE: 91 BPM

## 2020-08-31 PROCEDURE — 99214 OFFICE O/P EST MOD 30 MIN: CPT

## 2020-09-01 ENCOUNTER — RX RENEWAL (OUTPATIENT)
Age: 55
End: 2020-09-01

## 2020-09-01 ENCOUNTER — OUTPATIENT (OUTPATIENT)
Dept: OUTPATIENT SERVICES | Facility: HOSPITAL | Age: 55
LOS: 1 days | End: 2020-09-01
Payer: MEDICAID

## 2020-09-01 LAB
25(OH)D3 SERPL-MCNC: 77 NG/ML
ALBUMIN SERPL ELPH-MCNC: 4.3 G/DL
ALP BLD-CCNC: 91 U/L
ALT SERPL-CCNC: 17 U/L
ANION GAP SERPL CALC-SCNC: 14 MMOL/L
APPEARANCE: CLEAR
APTT BLD: 38.8 SEC
AST SERPL-CCNC: 22 U/L
BACTERIA: NEGATIVE
BASOPHILS # BLD AUTO: 0.01 K/UL
BASOPHILS NFR BLD AUTO: 0.2 %
BILIRUB SERPL-MCNC: 0.5 MG/DL
BILIRUBIN URINE: NEGATIVE
BLOOD URINE: NEGATIVE
BUN SERPL-MCNC: 25 MG/DL
C3 SERPL-MCNC: 120 MG/DL
C4 SERPL-MCNC: 29 MG/DL
CALCIUM SERPL-MCNC: 9.6 MG/DL
CHLORIDE SERPL-SCNC: 107 MMOL/L
CK SERPL-CCNC: 47 U/L
CO2 SERPL-SCNC: 20 MMOL/L
COLOR: YELLOW
CONFIRM: 33.1 SEC
CREAT SERPL-MCNC: 1.38 MG/DL
CREAT SPEC-SCNC: 128 MG/DL
CREAT/PROT UR: 1.2 RATIO
DRVVT IMM 1:2 NP PPP: NORMAL
DRVVT SCREEN TO CONFIRM RATIO: 0.89 RATIO
EOSINOPHIL # BLD AUTO: 0.04 K/UL
EOSINOPHIL NFR BLD AUTO: 0.8 %
GLUCOSE QUALITATIVE U: NEGATIVE
GLUCOSE SERPL-MCNC: 96 MG/DL
HCT VFR BLD CALC: 39.9 %
HGB BLD-MCNC: 12.7 G/DL
HYALINE CASTS: 1 /LPF
IMM GRANULOCYTES NFR BLD AUTO: 0.2 %
INR PPP: 1.02 RATIO
KETONES URINE: NEGATIVE
LEUKOCYTE ESTERASE URINE: NEGATIVE
LYMPHOCYTES # BLD AUTO: 1.27 K/UL
LYMPHOCYTES NFR BLD AUTO: 26.7 %
MAN DIFF?: NORMAL
MCHC RBC-ENTMCNC: 30.1 PG
MCHC RBC-ENTMCNC: 31.8 GM/DL
MCV RBC AUTO: 94.5 FL
MICROSCOPIC-UA: NORMAL
MONOCYTES # BLD AUTO: 0.6 K/UL
MONOCYTES NFR BLD AUTO: 12.6 %
NEUTROPHILS # BLD AUTO: 2.82 K/UL
NEUTROPHILS NFR BLD AUTO: 59.5 %
NITRITE URINE: NEGATIVE
PH URINE: 6
PLATELET # BLD AUTO: 246 K/UL
POTASSIUM SERPL-SCNC: 4.8 MMOL/L
PROT SERPL-MCNC: 7.1 G/DL
PROT UR-MCNC: 155 MG/DL
PROTEIN URINE: ABNORMAL
PT BLD: 12.1 SEC
RBC # BLD: 4.22 M/UL
RBC # FLD: 12.6 %
RED BLOOD CELLS URINE: 1 /HPF
SCREEN DRVVT: 32.7 SEC
SILICA CLOTTING TIME INTERPRETATION: NORMAL
SILICA CLOTTING TIME S/C: 0.91 RATIO
SODIUM SERPL-SCNC: 141 MMOL/L
SPECIFIC GRAVITY URINE: 1.03
SQUAMOUS EPITHELIAL CELLS: 1 /HPF
UROBILINOGEN URINE: NORMAL
WBC # FLD AUTO: 4.75 K/UL
WHITE BLOOD CELLS URINE: 0 /HPF

## 2020-09-01 PROCEDURE — H0002: CPT

## 2020-09-01 PROCEDURE — G9005: CPT

## 2020-09-02 LAB
B2 GLYCOPROT1 IGA SERPL IA-ACNC: <5 SAU
B2 GLYCOPROT1 IGG SER-ACNC: <5 SGU
B2 GLYCOPROT1 IGM SER-ACNC: <5 SMU
DSDNA AB SER-ACNC: <12 IU/ML
ENA RNP AB SER IA-ACNC: 3 AL
ENA SM AB SER IA-ACNC: 0.2 AL
ENA SS-A AB SER IA-ACNC: 0.3 AL
ENA SS-B AB SER IA-ACNC: <0.2 AL

## 2020-09-03 LAB
CARDIOLIPIN IGM SER-MCNC: 8.8 MPL
CARDIOLIPIN IGM SER-MCNC: <5 GPL
DEPRECATED CARDIOLIPIN IGA SER: <5 APL

## 2020-09-08 LAB
MISCELLANEOUS TEST: NORMAL
PROC NAME: NORMAL

## 2020-09-30 ENCOUNTER — APPOINTMENT (OUTPATIENT)
Dept: INTERNAL MEDICINE | Facility: CLINIC | Age: 55
End: 2020-09-30

## 2020-10-22 DIAGNOSIS — Z71.89 OTHER SPECIFIED COUNSELING: ICD-10-CM

## 2020-12-21 PROBLEM — Z87.09 HISTORY OF UPPER RESPIRATORY INFECTION: Status: RESOLVED | Noted: 2017-01-17 | Resolved: 2020-12-21

## 2021-01-26 ENCOUNTER — APPOINTMENT (OUTPATIENT)
Dept: RHEUMATOLOGY | Facility: CLINIC | Age: 56
End: 2021-01-26
Payer: MEDICAID

## 2021-01-26 VITALS
HEART RATE: 62 BPM | SYSTOLIC BLOOD PRESSURE: 119 MMHG | DIASTOLIC BLOOD PRESSURE: 87 MMHG | TEMPERATURE: 97.4 F | HEIGHT: 63 IN

## 2021-01-26 PROCEDURE — 99215 OFFICE O/P EST HI 40 MIN: CPT

## 2021-01-26 PROCEDURE — 99072 ADDL SUPL MATRL&STAF TM PHE: CPT

## 2021-01-27 LAB
25(OH)D3 SERPL-MCNC: 74.4 NG/ML
ALBUMIN SERPL ELPH-MCNC: 4.2 G/DL
ALP BLD-CCNC: 91 U/L
ALT SERPL-CCNC: 16 U/L
ANION GAP SERPL CALC-SCNC: 17 MMOL/L
APPEARANCE: CLEAR
AST SERPL-CCNC: 27 U/L
BACTERIA: NEGATIVE
BASOPHILS # BLD AUTO: 0.03 K/UL
BASOPHILS NFR BLD AUTO: 0.6 %
BILIRUB SERPL-MCNC: 0.5 MG/DL
BILIRUBIN URINE: NEGATIVE
BLOOD URINE: NEGATIVE
BUN SERPL-MCNC: 23 MG/DL
C3 SERPL-MCNC: 103 MG/DL
C4 SERPL-MCNC: 24 MG/DL
CALCIUM SERPL-MCNC: 10.1 MG/DL
CHLORIDE SERPL-SCNC: 103 MMOL/L
CK SERPL-CCNC: 70 U/L
CO2 SERPL-SCNC: 20 MMOL/L
COLOR: COLORLESS
CREAT SERPL-MCNC: 1.38 MG/DL
CREAT SPEC-SCNC: 49 MG/DL
CREAT/PROT UR: 0.8 RATIO
DSDNA AB SER-ACNC: 23 IU/ML
ENA RNP AB SER IA-ACNC: 3 AL
ENA SM AB SER IA-ACNC: 0.2 AL
ENA SS-A AB SER IA-ACNC: 0.2 AL
ENA SS-B AB SER IA-ACNC: <0.2 AL
EOSINOPHIL # BLD AUTO: 0.02 K/UL
EOSINOPHIL NFR BLD AUTO: 0.4 %
GLUCOSE QUALITATIVE U: NEGATIVE
HCT VFR BLD CALC: 39.4 %
HGB BLD-MCNC: 12.3 G/DL
HYALINE CASTS: 0 /LPF
IMM GRANULOCYTES NFR BLD AUTO: 0.2 %
KETONES URINE: NEGATIVE
LEUKOCYTE ESTERASE URINE: NEGATIVE
LYMPHOCYTES # BLD AUTO: 2.18 K/UL
LYMPHOCYTES NFR BLD AUTO: 40.4 %
MAN DIFF?: NORMAL
MCHC RBC-ENTMCNC: 29.6 PG
MCHC RBC-ENTMCNC: 31.2 GM/DL
MCV RBC AUTO: 94.7 FL
MICROSCOPIC-UA: NORMAL
MONOCYTES # BLD AUTO: 0.62 K/UL
MONOCYTES NFR BLD AUTO: 11.5 %
NEUTROPHILS # BLD AUTO: 2.53 K/UL
NEUTROPHILS NFR BLD AUTO: 46.9 %
NITRITE URINE: NEGATIVE
PH URINE: 7
PLATELET # BLD AUTO: 230 K/UL
POTASSIUM SERPL-SCNC: 4.4 MMOL/L
PROT SERPL-MCNC: 7 G/DL
PROT UR-MCNC: 38 MG/DL
PROTEIN URINE: ABNORMAL
RBC # BLD: 4.16 M/UL
RBC # FLD: 13.2 %
RED BLOOD CELLS URINE: 0 /HPF
SODIUM SERPL-SCNC: 139 MMOL/L
SPECIFIC GRAVITY URINE: 1.01
SQUAMOUS EPITHELIAL CELLS: 0 /HPF
TSH SERPL-ACNC: 2.29 UIU/ML
UROBILINOGEN URINE: NORMAL
WBC # FLD AUTO: 5.39 K/UL
WHITE BLOOD CELLS URINE: 0 /HPF

## 2021-02-18 ENCOUNTER — APPOINTMENT (OUTPATIENT)
Dept: MRI IMAGING | Facility: CLINIC | Age: 56
End: 2021-02-18
Payer: MEDICAID

## 2021-02-18 ENCOUNTER — OUTPATIENT (OUTPATIENT)
Dept: OUTPATIENT SERVICES | Facility: HOSPITAL | Age: 56
LOS: 1 days | End: 2021-02-18
Payer: MEDICAID

## 2021-02-18 DIAGNOSIS — M54.9 DORSALGIA, UNSPECIFIED: ICD-10-CM

## 2021-02-18 PROCEDURE — 72148 MRI LUMBAR SPINE W/O DYE: CPT

## 2021-02-18 PROCEDURE — 72148 MRI LUMBAR SPINE W/O DYE: CPT | Mod: 26

## 2021-02-22 ENCOUNTER — APPOINTMENT (OUTPATIENT)
Dept: RHEUMATOLOGY | Facility: CLINIC | Age: 56
End: 2021-02-22

## 2021-02-22 DIAGNOSIS — Z23 ENCOUNTER FOR IMMUNIZATION: ICD-10-CM

## 2021-03-02 ENCOUNTER — NON-APPOINTMENT (OUTPATIENT)
Age: 56
End: 2021-03-02

## 2021-03-03 ENCOUNTER — MED ADMIN CHARGE (OUTPATIENT)
Age: 56
End: 2021-03-03

## 2021-03-03 ENCOUNTER — OUTPATIENT (OUTPATIENT)
Dept: OUTPATIENT SERVICES | Facility: HOSPITAL | Age: 56
LOS: 1 days | End: 2021-03-03
Payer: MEDICAID

## 2021-03-03 ENCOUNTER — APPOINTMENT (OUTPATIENT)
Dept: INTERNAL MEDICINE | Facility: CLINIC | Age: 56
End: 2021-03-03
Payer: MEDICAID

## 2021-03-03 VITALS
WEIGHT: 124 LBS | SYSTOLIC BLOOD PRESSURE: 110 MMHG | OXYGEN SATURATION: 98 % | HEART RATE: 73 BPM | HEIGHT: 63 IN | BODY MASS INDEX: 21.97 KG/M2 | DIASTOLIC BLOOD PRESSURE: 70 MMHG

## 2021-03-03 DIAGNOSIS — Z87.898 PERSONAL HISTORY OF OTHER SPECIFIED CONDITIONS: ICD-10-CM

## 2021-03-03 DIAGNOSIS — R68.89 OTHER GENERAL SYMPTOMS AND SIGNS: ICD-10-CM

## 2021-03-03 DIAGNOSIS — Z86.19 PERSONAL HISTORY OF OTHER INFECTIOUS AND PARASITIC DISEASES: ICD-10-CM

## 2021-03-03 DIAGNOSIS — H83.2X3 LABYRINTHINE DYSFUNCTION, BILATERAL: ICD-10-CM

## 2021-03-03 DIAGNOSIS — N89.8 OTHER SPECIFIED NONINFLAMMATORY DISORDERS OF VAGINA: ICD-10-CM

## 2021-03-03 DIAGNOSIS — R10.2 PELVIC AND PERINEAL PAIN: ICD-10-CM

## 2021-03-03 DIAGNOSIS — Z86.59 PERSONAL HISTORY OF OTHER MENTAL AND BEHAVIORAL DISORDERS: ICD-10-CM

## 2021-03-03 DIAGNOSIS — H93.8X9 OTHER SPECIFIED DISORDERS OF EAR, UNSPECIFIED EAR: ICD-10-CM

## 2021-03-03 DIAGNOSIS — Z00.00 ENCOUNTER FOR GENERAL ADULT MEDICAL EXAMINATION W/OUT ABNORMAL FINDINGS: ICD-10-CM

## 2021-03-03 DIAGNOSIS — Z86.69 PERSONAL HISTORY OF OTHER DISEASES OF THE NERVOUS SYSTEM AND SENSE ORGANS: ICD-10-CM

## 2021-03-03 DIAGNOSIS — R44.0 AUDITORY HALLUCINATIONS: ICD-10-CM

## 2021-03-03 DIAGNOSIS — R06.6 HICCOUGH: ICD-10-CM

## 2021-03-03 DIAGNOSIS — K30 FUNCTIONAL DYSPEPSIA: ICD-10-CM
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
## 2021-03-03 DIAGNOSIS — Z01.419 ENCOUNTER FOR GYNECOLOGICAL EXAMINATION (GENERAL) (ROUTINE) W/OUT ABNORMAL FINDINGS: ICD-10-CM

## 2021-03-03 DIAGNOSIS — H61.23 IMPACTED CERUMEN, BILATERAL: ICD-10-CM

## 2021-03-03 DIAGNOSIS — R25.2 CRAMP AND SPASM: ICD-10-CM

## 2021-03-03 DIAGNOSIS — S61.459A OPEN BITE OF UNSPECIFIED HAND, INITIAL ENCOUNTER: ICD-10-CM

## 2021-03-03 DIAGNOSIS — I10 ESSENTIAL (PRIMARY) HYPERTENSION: ICD-10-CM

## 2021-03-03 DIAGNOSIS — H93.239 HYPERACUSIS, UNSPECIFIED EAR: ICD-10-CM

## 2021-03-03 DIAGNOSIS — Z86.39 PERSONAL HISTORY OF OTHER ENDOCRINE, NUTRITIONAL AND METABOLIC DISEASE: ICD-10-CM

## 2021-03-03 DIAGNOSIS — M79.609 PAIN IN UNSPECIFIED LIMB: ICD-10-CM

## 2021-03-03 DIAGNOSIS — H57.09: ICD-10-CM

## 2021-03-03 DIAGNOSIS — G43.109 MIGRAINE WITH AURA, NOT INTRACTABLE, W/OUT STATUS MIGRAINOSUS: ICD-10-CM

## 2021-03-03 DIAGNOSIS — N95.2 POSTMENOPAUSAL ATROPHIC VAGINITIS: ICD-10-CM

## 2021-03-03 DIAGNOSIS — Z87.19 PERSONAL HISTORY OF OTHER DISEASES OF THE DIGESTIVE SYSTEM: ICD-10-CM

## 2021-03-03 DIAGNOSIS — L98.8 OTHER SPECIFIED DISORDERS OF THE SKIN AND SUBCUTANEOUS TISSUE: ICD-10-CM

## 2021-03-03 DIAGNOSIS — Z78.0 ASYMPTOMATIC MENOPAUSAL STATE: ICD-10-CM

## 2021-03-03 DIAGNOSIS — L90.5 SCAR CONDITIONS AND FIBROSIS OF SKIN: ICD-10-CM

## 2021-03-03 PROBLEM — Z23 ENCOUNTER FOR IMMUNIZATION: Status: ACTIVE | Noted: 2021-03-03

## 2021-03-03 PROCEDURE — G0463: CPT

## 2021-03-03 PROCEDURE — 99396 PREV VISIT EST AGE 40-64: CPT | Mod: GC

## 2021-03-03 RX ORDER — SUCRALFATE 1 G/10ML
1 SUSPENSION ORAL 3 TIMES DAILY
Qty: 2 | Refills: 3 | Status: DISCONTINUED | COMMUNITY
Start: 2019-11-18 | End: 2021-03-03

## 2021-03-03 RX ORDER — ESTRADIOL 0.1 MG/G
0.1 CREAM VAGINAL
Qty: 1 | Refills: 5 | Status: DISCONTINUED | COMMUNITY
Start: 2019-08-28 | End: 2021-03-03

## 2021-03-03 RX ORDER — CHLORHEXIDINE GLUCONATE 4 %
400 (240 MG) LIQUID (ML) TOPICAL
Qty: 1 | Refills: 2 | Status: DISCONTINUED | COMMUNITY
Start: 2017-08-01 | End: 2021-03-03

## 2021-03-03 RX ORDER — OLANZAPINE 2.5 MG/1
2.5 TABLET, FILM COATED ORAL DAILY
Refills: 0 | Status: ACTIVE | COMMUNITY
Start: 2021-03-03

## 2021-03-03 RX ORDER — OMEPRAZOLE 40 MG/1
40 CAPSULE, DELAYED RELEASE ORAL
Qty: 90 | Refills: 3 | Status: DISCONTINUED | COMMUNITY
Start: 2019-11-18 | End: 2021-03-03

## 2021-03-03 RX ORDER — PSYLLIUM HUSK 0.4 G
1000-800 CAPSULE ORAL
Qty: 90 | Refills: 3 | Status: DISCONTINUED | COMMUNITY
Start: 2017-09-14 | End: 2021-03-03

## 2021-03-03 RX ORDER — CIPROFLOXACIN HYDROCHLORIDE 500 MG/1
500 TABLET, FILM COATED ORAL DAILY
Qty: 3 | Refills: 3 | Status: DISCONTINUED | COMMUNITY
Start: 2019-11-25 | End: 2021-03-03

## 2021-03-03 RX ORDER — CLONAZEPAM 1 MG/1
1 TABLET ORAL
Refills: 0 | Status: ACTIVE | COMMUNITY
Start: 2021-03-03

## 2021-03-03 RX ORDER — DEXTRAN, HYPROMELLOSE 2910 .001; .003 ML/ML; ML/ML
0.1-0.3 SOLUTION OPHTHALMIC
Qty: 1 | Refills: 0 | Status: DISCONTINUED | COMMUNITY
Start: 2019-08-22 | End: 2021-03-03

## 2021-03-03 RX ORDER — SERTRALINE HYDROCHLORIDE 100 MG/1
100 TABLET, FILM COATED ORAL DAILY
Refills: 0 | Status: ACTIVE | COMMUNITY
Start: 2021-03-03

## 2021-03-03 RX ORDER — MINERAL OIL, AND WHITE PETROLATUM 425; 573 MG/G; MG/G
OINTMENT OPHTHALMIC DAILY
Qty: 1 | Refills: 0 | Status: DISCONTINUED | COMMUNITY
Start: 2019-08-22 | End: 2021-03-03

## 2021-03-03 NOTE — REVIEW OF SYSTEMS
[Fatigue] : fatigue [Dryness] : dryness  [Heartburn] : heartburn [Joint Pain] : joint pain [Joint Stiffness] : joint stiffness [Joint Swelling] : joint swelling [Back Pain] : back pain [Depression] : depression [Negative] : Neurological [Fever] : no fever [Chills] : no chills [Recent Change In Weight] : ~T no recent weight change [Pain] : no pain [Vision Problems] : no vision problems [Itching] : no itching [Sore Throat] : no sore throat [Chest Pain] : no chest pain [Palpitations] : no palpitations [Lower Ext Edema] : no lower extremity edema [Shortness Of Breath] : no shortness of breath [Wheezing] : no wheezing [Cough] : no cough [Nausea] : no nausea [Constipation] : no constipation [Vomiting] : no vomiting [Melena] : no melena [Dysuria] : no dysuria [Suicidal] : not suicidal [FreeTextEntry4] : Difficulty swallowing

## 2021-03-03 NOTE — HEALTH RISK ASSESSMENT
[1] : 1) Little interest or pleasure doing things for several days (1) [No] : No [] : No [[id number]] : 2

## 2021-03-03 NOTE — PLAN
[FreeTextEntry1] : RTC in 12 months for CPE or sooner as needed. \par \par Care discussed with Dr. Leyva. \par \par Christiana Layne, PG1

## 2021-03-03 NOTE — HISTORY OF PRESENT ILLNESS
[FreeTextEntry1] : Right thumb pain and swelling, annual physical [de-identified] : 54 year old female, never smoker, w/ hx of Lupus and Lupus nephritis, depression, hypothyroidism presents for CPE, lumbar back pain, and right thumb joint swelling and pain. \par \par Right thumb PIP joint swelling, significant pain and discomfort, closed car door on right thumb once in the past, required a splint, no fractures. She reports ever since then (7-8 years ago) the right thumb has been becoming more and more deformed. Pain is severe, takes tylenol 650mg x2 about 4 times a week due to significant pain, no relief. Significantly impacting daily routine, affecting her . \par \par Lumbar pain\par Reported to rheum, MR lumbar spine done 2/2021. No spinal stenosis, mild lateral recess and foraminal narrowing on left at L5 / S1, No fractures or marrow edema. \par \par SLE\par On cellcept for almost 10 years, saw rheum 1/26/21. Most symptoms in knees and fingers, arthralgia\par Diagnosed 35 years ago, noticed finger swelling, rash on face\par Baseline chronic kidney disease & proteinuria as per chart review\par \par Depressed mood\par 5 years ago significant life stressors, father passed away,  left her\par Sees psychiatry Nacho Dennison \par Mood is improved and stable on sertraline, olanzapine, topiramate, clonazepam \par \par Dysphagia\par - Last saw GI 2019, reported to be asymptomatic at the time\par Esophageal motility study 11/ 2019 with an area of absent contraction (suggestive of scleroderma) and 2.7cm hiatal hernia \par EGD 09/ 2019 grade B esophagitis, 5cm hiatal hernia \par Someone informed pt that No surgery given risk outweighs benefit \par Eats small pieces of food, small portions of liquids

## 2021-03-03 NOTE — ASSESSMENT
[FreeTextEntry1] : 54 year old female w/ hx of Lupus, esophageal scleroderma, depression, arthritis, hypothyroidism presents for CPE, lumbar back pain, and right thumb joint swelling and pain likely associated with multiple autoimmune diseases.

## 2021-03-03 NOTE — PHYSICAL EXAM
[No JVD] : no jugular venous distention [Supple] : supple [Thyroid Normal, No Nodules] : the thyroid was normal and there were no nodules present [Pedal Pulses Present] : the pedal pulses are present [No Edema] : there was no peripheral edema [Normal] : no CVA or spinal tenderness [de-identified] : No oral ulcers [de-identified] : RIght anterior cervical LAD painful to palpation, mobile.  [de-identified] : right PIP joint deformity, radial deviation, large nodule in PIP tender to palpation, no effusion [de-identified] : No calcinosis

## 2021-03-04 DIAGNOSIS — M32.9 SYSTEMIC LUPUS ERYTHEMATOSUS, UNSPECIFIED: ICD-10-CM

## 2021-03-04 DIAGNOSIS — M34.1 CR(E)ST SYNDROME: ICD-10-CM

## 2021-03-04 DIAGNOSIS — F32.9 MAJOR DEPRESSIVE DISORDER, SINGLE EPISODE, UNSPECIFIED: ICD-10-CM

## 2021-03-04 DIAGNOSIS — M32.14 GLOMERULAR DISEASE IN SYSTEMIC LUPUS ERYTHEMATOSUS: ICD-10-CM

## 2021-03-04 DIAGNOSIS — M25.50 PAIN IN UNSPECIFIED JOINT: ICD-10-CM

## 2021-03-04 DIAGNOSIS — Z00.00 ENCOUNTER FOR GENERAL ADULT MEDICAL EXAMINATION WITHOUT ABNORMAL FINDINGS: ICD-10-CM

## 2021-03-29 ENCOUNTER — APPOINTMENT (OUTPATIENT)
Dept: NEPHROLOGY | Facility: CLINIC | Age: 56
End: 2021-03-29
Payer: MEDICAID

## 2021-03-29 VITALS
SYSTOLIC BLOOD PRESSURE: 107 MMHG | DIASTOLIC BLOOD PRESSURE: 69 MMHG | OXYGEN SATURATION: 100 % | TEMPERATURE: 98 F | HEART RATE: 60 BPM | WEIGHT: 124 LBS | BODY MASS INDEX: 21.97 KG/M2

## 2021-03-29 DIAGNOSIS — N25.0 RENAL OSTEODYSTROPHY: ICD-10-CM

## 2021-03-29 PROCEDURE — 99204 OFFICE O/P NEW MOD 45 MIN: CPT

## 2021-03-29 PROCEDURE — 99072 ADDL SUPL MATRL&STAF TM PHE: CPT

## 2021-03-29 NOTE — REVIEW OF SYSTEMS
[As Noted in HPI] : as noted in HPI [Joint Pain] : joint pain [Joint Swelling] : joint swelling [Negative] : Heme/Lymph [FreeTextEntry9] : hands

## 2021-03-29 NOTE — PHYSICAL EXAM
[General Appearance - Alert] : alert [General Appearance - In No Acute Distress] : in no acute distress [General Appearance - Well Nourished] : well nourished [Sclera] : the sclera and conjunctiva were normal [Outer Ear] : the ears and nose were normal in appearance [Hearing Threshold Finger Rub Not [place]] : hearing was normal [Neck Appearance] : the appearance of the neck was normal [] : no respiratory distress [Respiration, Rhythm And Depth] : normal respiratory rhythm and effort [Exaggerated Use Of Accessory Muscles For Inspiration] : no accessory muscle use [Auscultation Breath Sounds / Voice Sounds] : lungs were clear to auscultation bilaterally [Heart Rate And Rhythm] : heart rate was normal and rhythm regular [Heart Sounds] : normal S1 and S2 [Murmurs] : no murmurs [Heart Sounds Pericardial Friction Rub] : no pericardial rub [Edema] : there was no peripheral edema [Veins - Varicosity Changes] : there were no varicosital changes [Bowel Sounds] : normal bowel sounds [Abdomen Soft] : soft [No CVA Tenderness] : no ~M costovertebral angle tenderness [No Spinal Tenderness] : no spinal tenderness [Abnormal Walk] : normal gait [Musculoskeletal - Swelling] : no joint swelling seen [Motor Exam] : the motor exam was normal [No Focal Deficits] : no focal deficits [Impaired Insight] : insight and judgment were intact [Affect] : the affect was normal [Mood] : the mood was normal

## 2021-03-29 NOTE — ASSESSMENT
[FreeTextEntry1] : Ms. VALERIA PERRY is a 56 year-old woman with a PMH of SLE with lupus nephritis (diagnosed in year 2000), scleroderma, GERD and anxiety who presents [facility] for the management of lupus nephritis. \par \par 1. Lupus nephritis - according to Dr. Billingsley's notes, patient received Rituximab years ago (at least more than 8 years ago) as part of a research protocol. Now maintained on cellcept. Her serum creatinine and proteinuria has progressively worsened over the past 6 months. I will repeat BMP, urinalysis and UPCR today. I agree with Dr. Billingsley, if kidney function and proteinuria is worsening, she likely needs a repeat kidney biopsy.\par \par 2. Proteinuria - currently, we are attributing proteinuria to lupus nephritis. But I will also do additional workup to rule out paraproteinemia.\par \par 3. Chronic kidney disease stage 3a - Patient's eGFR was around 45 ml/min/1.73m2. The etiology of CKD is likely secondary to lupus nephritis. I will check a renal ultrasound to assess kidney size and to rule out renal mass/renal stones/ hydronephrosis. In order to slow progression, patient is advised have good blood pressure control and to avoid NSAIDs. \par >50% of the encounter was spent discussing about kidney function, stages of CKD, and steps to slow progression of kidney disease. \par \par 4. Metabolic acidosis - secondary to CKD. \par Current bicarb level is 20-21. I will repeat BMP today. I will consider starting sodium bicarbonate if serum bicarb level is persistently <20. \par \par 5. Renal bone disease - Since patient has CKD, we will screen for secondary hyperparathyroidism and hyperphosphatemia. I will check iPTH, vitamin d and calcium level today. \par \par 6. Anemia of CKD - goal Hb is 10-11.5. Her current Hb is 12.3g/dL. EPO is not indicated. \par \par 7. Medication monitoring encounter. Medication dose reviewed. \par \par Patient is recommended to follow up in 3 months. \par \par

## 2021-03-29 NOTE — CONSULT LETTER
[Dear  ___] : Dear  [unfilled], [Consult Letter:] : I had the pleasure of evaluating your patient, [unfilled]. [Please see my note below.] : Please see my note below. [Sincerely,] : Sincerely, [DrJewell  ___] : Dr. HILL [FreeTextEntry3] : Ryann Rodriguez MD\par Nephrology

## 2021-03-29 NOTE — HISTORY OF PRESENT ILLNESS
[FreeTextEntry1] : Ms. VALERIA PERRY is a 56 year-old woman with a PMH of SLE with lupus nephritis (diagnosed in year 2000), scleroderma, GERD, depression and anxiety who presents to [facility] for the management of lupus nephritis. \par \par Kidney history:\par Ms. PERRY has had joint pain and photosensitivity since her mid 20s. However, she was officailly diagnosed with SLE in year 2000. She also underwent a kidney biopsy then. She received rituximab years ago (at least 8 years ago) under a research protocol for her lupus nephritis. She has been on mycophenolate for maintenance. She follows her rheumatology care with Dr. Billingsley). Her serum creatinine had been in the range of 1.0 - 1.1mg/dL in 2018 and 2019. However, since 2020, her serum creatinine has progressively increased to 1.38mg/dL. Her urinalyses showed no evidence of RBC/WBC; and UPCR showed proteinuria of 0.8-1.2g/g since 2020. Her last kidney imaging (CT abdomen) was done in 2015, showing no evidence of kidney stones nor hydronephrosis. Her serological workup showed normal c3/ c4, dsDNA.  \par \par She noticed foamy urine intermittently, but denies hematuria or dysuria.She denies having nausea/vomiting/diarrhea. She has a good appetite.  She denies shortness of breath, chest pain, headache, edema. No hand tremors. She has joint pains but denies skin rash or hair loss. She denies NSAID use or herbal supplements.No family history of kidney disease/ kidney stones/ lupus. \par \par

## 2021-03-30 LAB
24R-OH-CALCIDIOL SERPL-MCNC: 48.8 PG/ML
25(OH)D3 SERPL-MCNC: 72.1 NG/ML
ALBUMIN MFR SERPL ELPH: 58.6 %
ALBUMIN SERPL ELPH-MCNC: 4.3 G/DL
ALBUMIN SERPL-MCNC: 4.1 G/DL
ALBUMIN/GLOB SERPL: 1.4 RATIO
ALPHA1 GLOB MFR SERPL ELPH: 0.7 %
ALPHA1 GLOB SERPL ELPH-MCNC: 0 G/DL
ALPHA2 GLOB MFR SERPL ELPH: 3.3 %
ALPHA2 GLOB SERPL ELPH-MCNC: 0.2 G/DL
ANION GAP SERPL CALC-SCNC: 11 MMOL/L
APPEARANCE: CLEAR
B-GLOBULIN MFR SERPL ELPH: 15.1 %
B-GLOBULIN SERPL ELPH-MCNC: 1.1 G/DL
BACTERIA: NEGATIVE
BASOPHILS # BLD AUTO: 0.01 K/UL
BASOPHILS NFR BLD AUTO: 0.3 %
BILIRUBIN URINE: NEGATIVE
BLOOD URINE: NEGATIVE
BUN SERPL-MCNC: 19 MG/DL
CALCIUM SERPL-MCNC: 9.8 MG/DL
CALCIUM SERPL-MCNC: 9.8 MG/DL
CHLORIDE SERPL-SCNC: 104 MMOL/L
CO2 SERPL-SCNC: 26 MMOL/L
COLOR: NORMAL
CREAT SERPL-MCNC: 1.14 MG/DL
CREAT SPEC-SCNC: 65 MG/DL
CREAT/PROT UR: 0.3 RATIO
DEPRECATED KAPPA LC FREE/LAMBDA SER: 1.16 RATIO
EOSINOPHIL # BLD AUTO: 0.03 K/UL
EOSINOPHIL NFR BLD AUTO: 0.8 %
GAMMA GLOB FLD ELPH-MCNC: 1.6 G/DL
GAMMA GLOB MFR SERPL ELPH: 22.3 %
GLUCOSE QUALITATIVE U: NEGATIVE
GLUCOSE SERPL-MCNC: 83 MG/DL
HCT VFR BLD CALC: 39.2 %
HGB BLD-MCNC: 12.2 G/DL
HYALINE CASTS: 0 /LPF
IMM GRANULOCYTES NFR BLD AUTO: 0 %
INTERPRETATION SERPL IEP-IMP: NORMAL
KAPPA LC CSF-MCNC: 3.1 MG/DL
KAPPA LC SERPL-MCNC: 3.59 MG/DL
KETONES URINE: NEGATIVE
LEUKOCYTE ESTERASE URINE: NEGATIVE
LYMPHOCYTES # BLD AUTO: 1.6 K/UL
LYMPHOCYTES NFR BLD AUTO: 43.8 %
M PROTEIN MFR SERPL ELPH: 0 %
M PROTEIN SPEC IFE-MCNC: NORMAL
MAN DIFF?: NORMAL
MCHC RBC-ENTMCNC: 28.8 PG
MCHC RBC-ENTMCNC: 31.1 GM/DL
MCV RBC AUTO: 92.7 FL
MICROSCOPIC-UA: NORMAL
MONOCLON BAND OBS SERPL: 0 G/DL
MONOCYTES # BLD AUTO: 0.44 K/UL
MONOCYTES NFR BLD AUTO: 12.1 %
NEUTROPHILS # BLD AUTO: 1.57 K/UL
NEUTROPHILS NFR BLD AUTO: 43 %
NITRITE URINE: NEGATIVE
PARATHYROID HORMONE INTACT: 34 PG/ML
PH URINE: 6.5
PHOSPHATE SERPL-MCNC: 3.6 MG/DL
PLATELET # BLD AUTO: 233 K/UL
POTASSIUM SERPL-SCNC: 4.8 MMOL/L
PROT SERPL-MCNC: 7 G/DL
PROT SERPL-MCNC: 7 G/DL
PROT UR-MCNC: 22 MG/DL
PROTEIN URINE: NORMAL
RBC # BLD: 4.23 M/UL
RBC # FLD: 13.2 %
RED BLOOD CELLS URINE: 0 /HPF
SODIUM SERPL-SCNC: 142 MMOL/L
SPECIFIC GRAVITY URINE: 1.01
SQUAMOUS EPITHELIAL CELLS: 0 /HPF
UROBILINOGEN URINE: NORMAL
WBC # FLD AUTO: 3.65 K/UL
WHITE BLOOD CELLS URINE: 0 /HPF

## 2021-03-31 ENCOUNTER — APPOINTMENT (OUTPATIENT)
Dept: ULTRASOUND IMAGING | Facility: CLINIC | Age: 56
End: 2021-03-31

## 2021-03-31 ENCOUNTER — OUTPATIENT (OUTPATIENT)
Dept: OUTPATIENT SERVICES | Facility: HOSPITAL | Age: 56
LOS: 1 days | End: 2021-03-31
Payer: MEDICAID

## 2021-03-31 DIAGNOSIS — M32.14 GLOMERULAR DISEASE IN SYSTEMIC LUPUS ERYTHEMATOSUS: ICD-10-CM

## 2021-03-31 LAB
C3 SERPL-MCNC: 101 MG/DL
C4 SERPL-MCNC: 25 MG/DL
DSDNA AB SER-ACNC: <12 IU/ML

## 2021-03-31 PROCEDURE — 76775 US EXAM ABDO BACK WALL LIM: CPT

## 2021-03-31 PROCEDURE — 76775 US EXAM ABDO BACK WALL LIM: CPT | Mod: 26

## 2021-05-17 ENCOUNTER — APPOINTMENT (OUTPATIENT)
Dept: RHEUMATOLOGY | Facility: CLINIC | Age: 56
End: 2021-05-17
Payer: MEDICAID

## 2021-05-17 VITALS
BODY MASS INDEX: 20.55 KG/M2 | WEIGHT: 116 LBS | HEIGHT: 63 IN | DIASTOLIC BLOOD PRESSURE: 73 MMHG | HEART RATE: 76 BPM | RESPIRATION RATE: 14 BRPM | SYSTOLIC BLOOD PRESSURE: 107 MMHG | TEMPERATURE: 98.1 F | OXYGEN SATURATION: 96 %

## 2021-05-17 PROCEDURE — 99215 OFFICE O/P EST HI 40 MIN: CPT

## 2021-05-17 PROCEDURE — 99072 ADDL SUPL MATRL&STAF TM PHE: CPT

## 2021-05-18 LAB
25(OH)D3 SERPL-MCNC: 65 NG/ML
ALBUMIN SERPL ELPH-MCNC: 4.5 G/DL
ALP BLD-CCNC: 82 U/L
ALT SERPL-CCNC: 11 U/L
ANION GAP SERPL CALC-SCNC: 11 MMOL/L
APPEARANCE: CLEAR
APTT BLD: 40.3 SEC
AST SERPL-CCNC: 18 U/L
BACTERIA: NEGATIVE
BASOPHILS # BLD AUTO: 0.02 K/UL
BASOPHILS NFR BLD AUTO: 0.5 %
BILIRUB SERPL-MCNC: 0.6 MG/DL
BILIRUBIN URINE: NEGATIVE
BLOOD URINE: NEGATIVE
BUN SERPL-MCNC: 31 MG/DL
C3 SERPL-MCNC: 107 MG/DL
C4 SERPL-MCNC: 26 MG/DL
CALCIUM SERPL-MCNC: 9.9 MG/DL
CHLORIDE SERPL-SCNC: 105 MMOL/L
CK SERPL-CCNC: 63 U/L
CO2 SERPL-SCNC: 24 MMOL/L
COLOR: YELLOW
CONFIRM: 30.5 SEC
CREAT SERPL-MCNC: 1.18 MG/DL
CREAT SPEC-SCNC: 155 MG/DL
CREAT/PROT UR: 0.3 RATIO
DRVVT IMM 1:2 NP PPP: NORMAL
DRVVT SCREEN TO CONFIRM RATIO: 0.85 RATIO
EOSINOPHIL # BLD AUTO: 0.03 K/UL
EOSINOPHIL NFR BLD AUTO: 0.7 %
GLUCOSE QUALITATIVE U: NEGATIVE
HCT VFR BLD CALC: 38.5 %
HGB BLD-MCNC: 12.1 G/DL
HYALINE CASTS: 1 /LPF
IMM GRANULOCYTES NFR BLD AUTO: 0.2 %
INR PPP: 1.05 RATIO
KETONES URINE: NEGATIVE
LEUKOCYTE ESTERASE URINE: NEGATIVE
LYMPHOCYTES # BLD AUTO: 1.71 K/UL
LYMPHOCYTES NFR BLD AUTO: 40.9 %
MAN DIFF?: NORMAL
MCHC RBC-ENTMCNC: 29.3 PG
MCHC RBC-ENTMCNC: 31.4 GM/DL
MCV RBC AUTO: 93.2 FL
MICROSCOPIC-UA: NORMAL
MONOCYTES # BLD AUTO: 0.45 K/UL
MONOCYTES NFR BLD AUTO: 10.8 %
NEUTROPHILS # BLD AUTO: 1.96 K/UL
NEUTROPHILS NFR BLD AUTO: 46.9 %
NITRITE URINE: NEGATIVE
PH URINE: 6.5
PLATELET # BLD AUTO: 229 K/UL
POTASSIUM SERPL-SCNC: 4.5 MMOL/L
PROT SERPL-MCNC: 7 G/DL
PROT UR-MCNC: 52 MG/DL
PROTEIN URINE: ABNORMAL
PT BLD: 12.5 SEC
RBC # BLD: 4.13 M/UL
RBC # FLD: 13.5 %
RED BLOOD CELLS URINE: 1 /HPF
SCREEN DRVVT: 35.1 SEC
SILICA CLOTTING TIME INTERPRETATION: NORMAL
SILICA CLOTTING TIME S/C: 0.97 RATIO
SODIUM SERPL-SCNC: 140 MMOL/L
SPECIFIC GRAVITY URINE: 1.03
SQUAMOUS EPITHELIAL CELLS: 0 /HPF
TSH SERPL-ACNC: 2.72 UIU/ML
UROBILINOGEN URINE: NORMAL
WBC # FLD AUTO: 4.18 K/UL
WHITE BLOOD CELLS URINE: 0 /HPF

## 2021-05-19 LAB
B2 GLYCOPROT1 IGA SERPL IA-ACNC: <5 SAU
B2 GLYCOPROT1 IGG SER-ACNC: <5 SGU
B2 GLYCOPROT1 IGM SER-ACNC: <5 SMU
CARDIOLIPIN IGM SER-MCNC: 8.9 MPL
CARDIOLIPIN IGM SER-MCNC: <5 GPL
DEPRECATED CARDIOLIPIN IGA SER: <5 APL
DSDNA AB SER-ACNC: <12 IU/ML

## 2021-05-24 LAB
MISCELLANEOUS TEST: NORMAL
PROC NAME: NORMAL

## 2021-07-19 ENCOUNTER — LABORATORY RESULT (OUTPATIENT)
Age: 56
End: 2021-07-19

## 2021-07-19 ENCOUNTER — APPOINTMENT (OUTPATIENT)
Dept: RHEUMATOLOGY | Facility: CLINIC | Age: 56
End: 2021-07-19
Payer: MEDICAID

## 2021-07-19 VITALS
TEMPERATURE: 97.6 F | HEIGHT: 63 IN | OXYGEN SATURATION: 98 % | HEART RATE: 74 BPM | DIASTOLIC BLOOD PRESSURE: 76 MMHG | BODY MASS INDEX: 20.38 KG/M2 | WEIGHT: 115 LBS | SYSTOLIC BLOOD PRESSURE: 116 MMHG | RESPIRATION RATE: 14 BRPM

## 2021-07-19 DIAGNOSIS — M19.90 UNSPECIFIED OSTEOARTHRITIS, UNSPECIFIED SITE: ICD-10-CM

## 2021-07-19 DIAGNOSIS — M25.50 PAIN IN UNSPECIFIED JOINT: ICD-10-CM

## 2021-07-19 PROCEDURE — 99214 OFFICE O/P EST MOD 30 MIN: CPT

## 2021-07-20 LAB
25(OH)D3 SERPL-MCNC: 55.1 NG/ML
ALBUMIN SERPL ELPH-MCNC: 4.3 G/DL
ALP BLD-CCNC: 84 U/L
ALT SERPL-CCNC: 11 U/L
ANION GAP SERPL CALC-SCNC: 11 MMOL/L
APPEARANCE: CLEAR
AST SERPL-CCNC: 20 U/L
BACTERIA: NEGATIVE
BASOPHILS # BLD AUTO: 0.02 K/UL
BASOPHILS NFR BLD AUTO: 0.9 %
BILIRUB SERPL-MCNC: 0.4 MG/DL
BILIRUBIN URINE: NEGATIVE
BLOOD URINE: NEGATIVE
BUN SERPL-MCNC: 18 MG/DL
CALCIUM SERPL-MCNC: 9.5 MG/DL
CHLORIDE SERPL-SCNC: 107 MMOL/L
CK SERPL-CCNC: 62 U/L
CO2 SERPL-SCNC: 22 MMOL/L
COLOR: YELLOW
COVID-19 SPIKE DOMAIN ANTIBODY INTERPRETATION: POSITIVE
CREAT SERPL-MCNC: 1.15 MG/DL
CREAT SPEC-SCNC: 192 MG/DL
CREAT/PROT UR: 0.3 RATIO
ENA RNP AB SER IA-ACNC: 2.4 AL
ENA SM AB SER IA-ACNC: 0.2 AL
ENA SS-A AB SER IA-ACNC: <0.2 AL
ENA SS-B AB SER IA-ACNC: <0.2 AL
EOSINOPHIL # BLD AUTO: 0.06 K/UL
EOSINOPHIL NFR BLD AUTO: 2.8 %
GLUCOSE QUALITATIVE U: NEGATIVE
HCT VFR BLD CALC: 36.6 %
HGB BLD-MCNC: 11.7 G/DL
HYALINE CASTS: 0 /LPF
IMM GRANULOCYTES NFR BLD AUTO: 0.5 %
KETONES URINE: NEGATIVE
LEUKOCYTE ESTERASE URINE: NEGATIVE
LYMPHOCYTES # BLD AUTO: 0.95 K/UL
LYMPHOCYTES NFR BLD AUTO: 43.6 %
MAN DIFF?: NORMAL
MCHC RBC-ENTMCNC: 30 PG
MCHC RBC-ENTMCNC: 32 GM/DL
MCV RBC AUTO: 93.8 FL
MICROSCOPIC-UA: NORMAL
MONOCYTES # BLD AUTO: 0.29 K/UL
MONOCYTES NFR BLD AUTO: 13.3 %
NEUTROPHILS # BLD AUTO: 0.85 K/UL
NEUTROPHILS NFR BLD AUTO: 38.9 %
NITRITE URINE: NEGATIVE
PH URINE: 6
PLATELET # BLD AUTO: 209 K/UL
POTASSIUM SERPL-SCNC: 4.9 MMOL/L
PROT SERPL-MCNC: 6.5 G/DL
PROT UR-MCNC: 55 MG/DL
PROTEIN URINE: ABNORMAL
RBC # BLD: 3.9 M/UL
RBC # FLD: 13.2 %
RED BLOOD CELLS URINE: 1 /HPF
SARS-COV-2 AB SERPL IA-ACNC: >250 U/ML
SODIUM SERPL-SCNC: 140 MMOL/L
SPECIFIC GRAVITY URINE: 1.02
SQUAMOUS EPITHELIAL CELLS: 0 /HPF
TSH SERPL-ACNC: 5.1 UIU/ML
UROBILINOGEN URINE: NORMAL
WBC # FLD AUTO: 2.18 K/UL
WHITE BLOOD CELLS URINE: 0 /HPF

## 2021-07-21 LAB
C3 SERPL-MCNC: 116 MG/DL
C4 SERPL-MCNC: 29 MG/DL
DSDNA AB SER-ACNC: <12 IU/ML

## 2021-07-26 ENCOUNTER — APPOINTMENT (OUTPATIENT)
Dept: NEPHROLOGY | Facility: CLINIC | Age: 56
End: 2021-07-26
Payer: MEDICAID

## 2021-07-26 DIAGNOSIS — R80.1 PERSISTENT PROTEINURIA, UNSPECIFIED: ICD-10-CM

## 2021-07-26 DIAGNOSIS — N18.31 CHRONIC KIDNEY DISEASE, STAGE 3A: ICD-10-CM

## 2021-07-26 PROCEDURE — 99442: CPT

## 2021-07-26 NOTE — ASSESSMENT
[FreeTextEntry1] : Ms. VALERIA PERRY is a 56 year-old woman with a PMH of SLE with lupus nephritis (diagnosed in year 2000), scleroderma, GERD and anxiety who presents to [facility] for the management of lupus nephritis. \par \par 1. Lupus nephritis - according to Dr. Billingsley's notes, patient received Rituximab years ago (at least more than 8 years ago) as part of a research protocol. Now maintained on cellcept. Given that serum creatinine is back to baseline and UPCR remains stable at 0.3 g/g, a kidney biopsy is not indicated. \par \par 2. Proteinuria - currently, we are attributing proteinuria to lupus nephritis.Paraproteinemia workup was negative. \par \par 3. Chronic kidney disease stage 3a - Patient's eGFR was around 53 ml/min/1.73m2. The etiology of CKD is likely secondary to lupus nephritis. Renal ultrasound (3/2021) showed not evidence of hydronephrosis/ mass. Her right kidney was 9.4cm, and left kidney was 8.6cm. In order to slow progression, patient is advised have good blood pressure control and to avoid NSAIDs. \par >50% of the encounter was spent discussing about kidney function, stages of CKD, and steps to slow progression of kidney disease. \par \par 4. Anemia of CKD - goal Hb is 10-11.5. Her current Hb is 12.3g/dL. EPO is not indicated. \par \par Patient is recommended to follow up in 6 months. \par \par

## 2021-07-26 NOTE — HISTORY OF PRESENT ILLNESS
[Home] : at home, [unfilled] , at the time of the visit. [Medical Office: ([facility])___] : at the medical office located in  [Verbal consent obtained from patient] : the patient, [unfilled] [FreeTextEntry1] : Ms. VALERIA PERRY is a 56 year-old woman with a PMH of SLE with lupus nephritis (diagnosed in year 2000), scleroderma, GERD, depression and anxiety who presents to [facility] for the management of lupus nephritis. \par \par Kidney history:\par Ms. PERRY has had joint pain and photosensitivity since her mid 20s. However, she was officailly diagnosed with SLE in year 2000. She also underwent a kidney biopsy then. She received rituximab years ago (at least 8 years ago) under a research protocol for her lupus nephritis. She has been on mycophenolate for maintenance. She follows her rheumatology care with Dr. Billingsley). Her serum creatinine had been in the range of 1.0 - 1.1mg/dL in 2018 and 2019. However, since 2020, her serum creatinine has progressively increased to 1.38mg/dL. Her urinalyses showed no evidence of RBC/WBC; and UPCR showed proteinuria of 0.8-1.2g/g since 2020. Her last kidney imaging (CT abdomen) was done in 2015, showing no evidence of kidney stones nor hydronephrosis. Her serological workup showed normal c3/ c4, dsDNA.  \par \par Interval history:\par Since the last visit, her serum creatinine has stayed stable at 1.1mg/dL. Her UPCR was 0.3g/g. No evidence of RBC/WBC in the urine. Currently, she denies having nausea/vomiting/metallic taste in Her mouth. She has a good appetite. She denies hematuria, frothy urine or dysuria. She denies shortness of breath, chest pain, headache, edema. No hand tremors. She denies NSAID use or herbal supplements. \par

## 2021-08-10 ENCOUNTER — NON-APPOINTMENT (OUTPATIENT)
Age: 56
End: 2021-08-10

## 2021-08-24 ENCOUNTER — APPOINTMENT (OUTPATIENT)
Dept: GASTROENTEROLOGY | Facility: HOSPITAL | Age: 56
End: 2021-08-24
Payer: MEDICAID

## 2021-08-24 VITALS
RESPIRATION RATE: 14 BRPM | TEMPERATURE: 96 F | HEIGHT: 63 IN | DIASTOLIC BLOOD PRESSURE: 77 MMHG | WEIGHT: 115 LBS | SYSTOLIC BLOOD PRESSURE: 121 MMHG | HEART RATE: 66 BPM | BODY MASS INDEX: 20.38 KG/M2

## 2021-08-24 NOTE — HISTORY OF PRESENT ILLNESS
[de-identified] : VALERIA PERRY is a 56 year F here for follow up. She has a history of esophageal stricture requiring dilation (last 2016, dilated up to 18mm), as well as symptoms of heart burn and LUQ pain since 2014, treated w/ some success w/ anti-reflux medications. She has had multiple EGDs and has been negative for H pylori and EoE. In 2015 she was diagnosed and treated for candida esophagitis. She has had NM Gastric Emptying Study that was wnl. \par \par She had BRAVO study 10/2019 w/ Dr. Marie. EGD showed Grade B esophagitis, 5cm hiatal hernia, and gastritis. Path neg for H pylori. BRAVO was positive w/ good symptom correlation, DeMeester score of 32.3. Thought was that hiatal hernia is largely responsible for pt' sx.\par \par She was seen by me 10/2019, with plan to refer her to CT Sx for possible anti-reflux surgery. As part of that work-up she underwent E-mano, showing findings of aperistalsis with open LES, consistent with scleroderma esophagus, this is not an unexpected finding in a patient with lupus. Based on this, her surgery was cancelled. \par \par Today,

## 2021-10-01 ENCOUNTER — APPOINTMENT (OUTPATIENT)
Dept: INTERNAL MEDICINE | Facility: CLINIC | Age: 56
End: 2021-10-01
Payer: MEDICAID

## 2021-10-01 ENCOUNTER — OUTPATIENT (OUTPATIENT)
Dept: OUTPATIENT SERVICES | Facility: HOSPITAL | Age: 56
LOS: 1 days | End: 2021-10-01
Payer: MEDICAID

## 2021-10-01 VITALS
DIASTOLIC BLOOD PRESSURE: 78 MMHG | HEIGHT: 63 IN | SYSTOLIC BLOOD PRESSURE: 118 MMHG | BODY MASS INDEX: 19.84 KG/M2 | HEART RATE: 70 BPM | WEIGHT: 112 LBS | OXYGEN SATURATION: 99 %

## 2021-10-01 DIAGNOSIS — J02.0 STREPTOCOCCAL PHARYNGITIS: ICD-10-CM

## 2021-10-01 DIAGNOSIS — I10 ESSENTIAL (PRIMARY) HYPERTENSION: ICD-10-CM

## 2021-10-01 PROCEDURE — G0463: CPT

## 2021-10-01 PROCEDURE — T1013: CPT

## 2021-10-01 PROCEDURE — 99213 OFFICE O/P EST LOW 20 MIN: CPT | Mod: GE

## 2021-10-08 NOTE — PHYSICAL EXAM
[Normal] : affect was normal and insight and judgment were intact [de-identified] : Erythematous oropharynx without exudates  [de-identified] : tender anterior cervical lymphadenopathy

## 2021-10-08 NOTE — HISTORY OF PRESENT ILLNESS
[Time Spent: ____ minutes] : Total time spent using  services: [unfilled] minutes. The patient's primary language is not English thus required  services. [FreeTextEntry1] : Follow up  [de-identified] : 56 year old female PMH SLE with lupus nephritis, scleroderma, GERD and anxiety presenting for a follow up. \par \par Lost her voice, three days ago, first was coughing without phlegm (dry cough). Not tested for COVID. Vaccinated for COVID- second dose 4/2/21, pfizer. Cough when sleeping. When swallows a little bit of pain. \par \par No fever, sore throat, no runny nose. No headaches, no chest pain, no shortness of breath, no abdominal pain \par \par had this in the past and got antibiotics, said it was due to the change in weather. happened at about the same time during the year previously, not last year but prior years.  [[employer] ] : provided by [employer]   [FreeTextEntry8] : 56 year old female PMH SLE with lupus nephritis, scleroderma, GERD and anxiety presenting for an acute visit. \par \par Patient states that three days ago she developed a dry cough which resolved and she subsequently lost her voice. States that she has odynophagia, but denies fevers, runny nose. Denies headaches, shortness of breath, chest pain or abdominal pain. Vaccinated for COVID with Pfizer (last dose 4/2/21). \par \par States she had a similar illness in 2018 and was treated with antibiotics by her rheumatologist. Was told that her illness was due to changes in weather.  [Interpreters_IDNumber] : [id number] [Interpreters_FullName] : Chaitanya [TWNoteComboBox1] : [place]

## 2021-10-08 NOTE — PHYSICAL EXAM
[Normal] : affect was normal and insight and judgment were intact [de-identified] : Erythematous oropharynx without exudates  [de-identified] : tender anterior cervical lymphadenopathy

## 2021-10-08 NOTE — HISTORY OF PRESENT ILLNESS
[Time Spent: ____ minutes] : Total time spent using  services: [unfilled] minutes. The patient's primary language is not English thus required  services. [FreeTextEntry1] : Follow up  [de-identified] : 56 year old female PMH SLE with lupus nephritis, scleroderma, GERD and anxiety presenting for a follow up. \par \par Lost her voice, three days ago, first was coughing without phlegm (dry cough). Not tested for COVID. Vaccinated for COVID- second dose 4/2/21, pfizer. Cough when sleeping. When swallows a little bit of pain. \par \par No fever, sore throat, no runny nose. No headaches, no chest pain, no shortness of breath, no abdominal pain \par \par had this in the past and got antibiotics, said it was due to the change in weather. happened at about the same time during the year previously, not last year but prior years.  [[employer] ] : provided by [employer]   [FreeTextEntry8] : 56 year old female PMH SLE with lupus nephritis, scleroderma, GERD and anxiety presenting for an acute visit. \par \par Patient states that three days ago she developed a dry cough which resolved and she subsequently lost her voice. States that she has odynophagia, but denies fevers, runny nose. Denies headaches, shortness of breath, chest pain or abdominal pain. Vaccinated for COVID with Pfizer (last dose 4/2/21). \par \par States she had a similar illness in 2018 and was treated with antibiotics by her rheumatologist. Was told that her illness was due to changes in weather.  [Interpreters_IDNumber] : [id number] [Interpreters_FullName] : Chaitanya [TWNoteComboBox1] : Czech

## 2021-10-08 NOTE — ASSESSMENT
[FreeTextEntry1] : 56 year old female PMH SLE with lupus nephritis, scleroderma, GERD, anxiety presenting to clinic for an acute visit \par \par #Strep Pharyngitis \par - patient with tender anterior cervical lymphadenopathy and erythematous oropharynx \par - Centor score 1 however patient is immunocompromised \par - will treat with Amoxicillin 500mg BID for 10 days \par - advised patient to return to clinic if symptoms do not improve or if lymphadenopathy persists\par \par Puja Loyola MD PGY1 \par Internal Medicine

## 2021-10-08 NOTE — REVIEW OF SYSTEMS
[Sore Throat] : sore throat [Cough] : cough [Fever] : no fever [Chills] : no chills [Fatigue] : no fatigue [Earache] : no earache [Hearing Loss] : no hearing loss [Nasal Discharge] : no nasal discharge [Chest Pain] : no chest pain [Shortness Of Breath] : no shortness of breath [Wheezing] : no wheezing [Abdominal Pain] : no abdominal pain [Nausea] : no nausea [Constipation] : no constipation [Diarrhea] : diarrhea [Vomiting] : no vomiting

## 2021-10-11 DIAGNOSIS — J02.0 STREPTOCOCCAL PHARYNGITIS: ICD-10-CM

## 2021-11-22 ENCOUNTER — APPOINTMENT (OUTPATIENT)
Dept: INTERNAL MEDICINE | Facility: CLINIC | Age: 56
End: 2021-11-22
Payer: MEDICAID

## 2021-11-22 ENCOUNTER — OUTPATIENT (OUTPATIENT)
Dept: OUTPATIENT SERVICES | Facility: HOSPITAL | Age: 56
LOS: 1 days | End: 2021-11-22
Payer: MEDICAID

## 2021-11-22 ENCOUNTER — RESULT REVIEW (OUTPATIENT)
Age: 56
End: 2021-11-22

## 2021-11-22 VITALS
DIASTOLIC BLOOD PRESSURE: 78 MMHG | OXYGEN SATURATION: 97 % | HEIGHT: 63 IN | HEART RATE: 71 BPM | WEIGHT: 112 LBS | BODY MASS INDEX: 19.84 KG/M2 | SYSTOLIC BLOOD PRESSURE: 112 MMHG

## 2021-11-22 DIAGNOSIS — I10 ESSENTIAL (PRIMARY) HYPERTENSION: ICD-10-CM

## 2021-11-22 PROCEDURE — 99213 OFFICE O/P EST LOW 20 MIN: CPT | Mod: GE

## 2021-11-22 PROCEDURE — 90688 IIV4 VACCINE SPLT 0.5 ML IM: CPT

## 2021-11-22 PROCEDURE — G0008: CPT

## 2021-11-22 PROCEDURE — G0463: CPT | Mod: 25

## 2021-11-22 RX ORDER — PRAZOSIN HYDROCHLORIDE 2 MG/1
2 CAPSULE ORAL
Refills: 0 | Status: DISCONTINUED | COMMUNITY
Start: 2021-03-03 | End: 2021-11-22

## 2021-11-22 RX ORDER — AMOXICILLIN 500 MG/1
500 TABLET, FILM COATED ORAL
Qty: 20 | Refills: 0 | Status: DISCONTINUED | COMMUNITY
Start: 2021-10-01 | End: 2021-11-22

## 2021-11-22 RX ORDER — HUMIDIFIER
EACH MISCELLANEOUS
Qty: 1 | Refills: 0 | Status: ACTIVE | COMMUNITY
Start: 2021-11-22 | End: 1900-01-01

## 2021-11-22 NOTE — PHYSICAL EXAM
[Normal Outer Ear/Nose] : the outer ears and nose were normal in appearance [Normal Oropharynx] : the oropharynx was normal [No Lymphadenopathy] : no lymphadenopathy [No Edema] : there was no peripheral edema [Normal] : no rash [No Focal Deficits] : no focal deficits [Normal Affect] : the affect was normal [Alert and Oriented x3] : oriented to person, place, and time [Normal Mood] : the mood was normal

## 2021-11-23 ENCOUNTER — APPOINTMENT (OUTPATIENT)
Dept: GASTROENTEROLOGY | Facility: HOSPITAL | Age: 56
End: 2021-11-23
Payer: MEDICAID

## 2021-11-23 ENCOUNTER — RESULT REVIEW (OUTPATIENT)
Age: 56
End: 2021-11-23

## 2021-11-23 ENCOUNTER — OUTPATIENT (OUTPATIENT)
Dept: OUTPATIENT SERVICES | Facility: HOSPITAL | Age: 56
LOS: 1 days | End: 2021-11-23
Payer: MEDICAID

## 2021-11-23 VITALS
BODY MASS INDEX: 19.14 KG/M2 | HEIGHT: 63 IN | DIASTOLIC BLOOD PRESSURE: 76 MMHG | SYSTOLIC BLOOD PRESSURE: 106 MMHG | TEMPERATURE: 96.5 F | HEART RATE: 72 BPM | WEIGHT: 108 LBS

## 2021-11-23 DIAGNOSIS — R10.12 LEFT UPPER QUADRANT PAIN: ICD-10-CM

## 2021-11-23 DIAGNOSIS — R49.0 DYSPHONIA: ICD-10-CM

## 2021-11-23 DIAGNOSIS — K21.9 GASTRO-ESOPHAGEAL REFLUX DISEASE WITHOUT ESOPHAGITIS: ICD-10-CM

## 2021-11-23 DIAGNOSIS — R10.9 UNSPECIFIED ABDOMINAL PAIN: ICD-10-CM

## 2021-11-23 DIAGNOSIS — M34.1 CR(E)ST SYNDROME: ICD-10-CM

## 2021-11-23 PROCEDURE — 99214 OFFICE O/P EST MOD 30 MIN: CPT | Mod: GC

## 2021-11-23 PROCEDURE — G0463: CPT

## 2021-11-23 NOTE — PHYSICAL EXAM
[General Appearance - Alert] : alert [General Appearance - In No Acute Distress] : in no acute distress [General Appearance - Well Nourished] : well nourished [General Appearance - Well Developed] : well developed [Sclera] : the sclera and conjunctiva were normal [PERRL With Normal Accommodation] : pupils were equal in size, round, and reactive to light [Extraocular Movements] : extraocular movements were intact [Outer Ear] : the ears and nose were normal in appearance [Hearing Threshold Finger Rub Not [place]] : hearing was normal [Examination Of The Oral Cavity] : the lips and gums were normal [Neck Appearance] : the appearance of the neck was normal [Neck Cervical Mass (___cm)] : no neck mass was observed [Jugular Venous Distention Increased] : there was no jugular-venous distention [Exaggerated Use Of Accessory Muscles For Inspiration] : no accessory muscle use [Heart Rate And Rhythm] : heart rate was normal and rhythm regular [Edema] : there was no peripheral edema [Bowel Sounds] : normal bowel sounds [Abdomen Soft] : soft [Abdomen Tenderness] : non-tender [] : no hepato-splenomegaly [No CVA Tenderness] : no ~M costovertebral angle tenderness [No Spinal Tenderness] : no spinal tenderness [Abnormal Walk] : normal gait [Nail Clubbing] : no clubbing  or cyanosis of the fingernails [Musculoskeletal - Swelling] : no joint swelling seen [Oriented To Time, Place, And Person] : oriented to person, place, and time [Affect] : the affect was normal

## 2021-11-23 NOTE — REVIEW OF SYSTEMS
[Hoarseness] : hoarseness [Sore Throat] : sore throat [Anxiety] : anxiety [Negative] : Heme/Lymph [Fever] : no fever [Chills] : no chills [Fatigue] : no fatigue [Night Sweats] : no night sweats [Recent Change In Weight] : ~T no recent weight change [Earache] : no earache [Hearing Loss] : no hearing loss [Nasal Discharge] : no nasal discharge [Postnasal Drip] : no postnasal drip

## 2021-11-23 NOTE — END OF VISIT
[] : Fellow [FreeTextEntry3] : As modified and discussed with patient\par MD GRICELDA Arizmendi F[facility], [place]\par Associate Professor of Medicine\par [facility]\par   [Time Spent: ___ minutes] : I have spent [unfilled] minutes of time on the encounter.

## 2021-11-23 NOTE — PLAN
[FreeTextEntry1] : \par 56 F with hx of SLE, SLE nephritis, GERD, scleroderma, anxiety and depression presenting to clinic with complaints of hoarseness\par \par # Hoarseness, sore throat\par - Likely caused by persistent GERD 2/2 scleroderma. Also considered post nasal drip although patient does not have congestion or runny nose. Low suspicion for infectious etiology at this time.\par - F/u GI - patient has appointment tomorrow\par - Continue omeprazole 20mg BID\par - ENT referral provided \par - Recommended humidifier to improve quality of sleep. Patient wakes up with dry mouth which worsens symptoms. Sent prescription to pharmacy to see if insurance covers. \par \par # HCM\par - Flu shot given this visit\par - Mammogram referral provided\par - DEXA scan referral also provided per rheumatology recommendations\par - RTC in 10 weeks\par \par \par Discussed with Dr. Brown\par \par Larry Day, PGY-2\par IMP[facility]

## 2021-11-23 NOTE — HISTORY OF PRESENT ILLNESS
[de-identified] : Last seen in GI clinic 10/15/19\par 55 yo F w/ PMHx SLE c/b lupus nephritis, anxiety, depression here for f/u\par \par Today the following issues were discussed:\par # abd pain - patient states after eating or drinking has LUQ abd pain, described as soreness, no burning, usually in morning but sometimes present in afternoon/evening, lasts ~10 minutes and then self-resolves; happens about 3x per week, no nausea/vomiting. Patient is on omeprazole 20 bid\par \par # Dysphagia: She had dysphagia in [id number] and was found to have a distal esophageal stricture that required intermittent dilations (last in 2016, up to 18 mm). Since then she has reported resolution of dysphagia. She had BRAVO 2019 for GERD symptoms (see below) w/ +demeester score (32.3), good correlation, however symptoms were attributed to patients' 5cm HH. \par She underwent Manometry 11/13/2019 w/ aperistalsis and open LES (LES = 11 (13-43), UES = 61.4 (). 100% swallows w/ absent peristalsis, 100% swallows w/ incomplete bolus clearance by impedence analysis. A 2.7cm hiatal hernia), consistent with scleroderma esophagus, however patient at this time had no dysphagia symptoms, so planned hernia repair surgery was cancelled (had been seeing Dr. Armani Lowry)\par Today (11/23/21) states dysphagia is improved, has been eating slowly and chewing foods. \par \par Gastric emptying study in 2017 was normal. She had neg Ab testing for scleroderma. \par \par # Hoarseness - patient reporting recurrent episodes of soar throat and hoarseness, seen by medicine yesterday, on PPI. Patient states that she drinks water after eating with improvement of her hoarseness. \par \par #GERD:\par She has endorsed symptoms of heartburn and LUQ pain since 2014, and treated with PPI and H2 blocker. Work-up has included multiple EGDs - she has tested neg for H pylori and eosinophilic esophagitis. She was found to have candida esophagitis in 2015, s/p treatment.\par She had FOLEY study w/ Dr. Marie 9/2019. EGD showed Grade B esophagitis, 5cm hiatal hernia, and gastritis. Path neg for H pylori. BRAVO was positive w/ good symptom correlation, DeMeester score of 32.3. Thought was that hiatal hernia is largely responsible for pt' sx, so plan was for hernair repair surgery. Underwent manometry testing in preparation for hernia repair surgery (11/2019) notable for open LES and aperistalsis. Decision made not to pursue hernia repair surgery. \par 11/23/21- states having abd pain, but no GERD\par \par #Colon Cancer screening:\par She also had a colonoscopy in 2014 that showed internal hemorrhoids, but no evidence of microscopic colitis.Plan to follow up 2024\par

## 2021-11-23 NOTE — HISTORY OF PRESENT ILLNESS
[FreeTextEntry8] : 56 F with hx of SLE, SLE nephritis, scleroderma, GERD, anxiety, depression, presenting to clinic with complaints of hoarseness. Patient had a visit last month for similar symptoms for which she was prescribed a course of amoxicillin for possible pharyngitis. After antibiotic course, patient reported feeling better, but her symptoms returned after 3 weeks. She currently endorses some pain with swallowing. Denies any fevers, chills, cough, runny nose, nasal congestion, post nasal drip, phlegm. Patient continues to take omeprazole which helps with her GERD symptoms. She has to eat slowly and has small meals due to her condition. No nausea, vomiting, abdominal pain, changes in BMs. Patient has GI appointment tomorrow.

## 2021-11-23 NOTE — ASSESSMENT
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 Unclear etiology. Will want to rule out biliary pathology. Otherwise gastric emptying study was normal in 2017. 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 Newman\par \par Mich Wiggins, PGY5\par GI Fellow

## 2021-11-29 DIAGNOSIS — K21.9 GASTRO-ESOPHAGEAL REFLUX DISEASE WITHOUT ESOPHAGITIS: ICD-10-CM

## 2021-11-29 DIAGNOSIS — Z23 ENCOUNTER FOR IMMUNIZATION: ICD-10-CM

## 2021-11-29 DIAGNOSIS — R49.0 DYSPHONIA: ICD-10-CM

## 2021-12-01 PROCEDURE — G9005: CPT

## 2021-12-03 ENCOUNTER — OUTPATIENT (OUTPATIENT)
Dept: OUTPATIENT SERVICES | Facility: HOSPITAL | Age: 56
LOS: 1 days | End: 2021-12-03
Payer: MEDICAID

## 2021-12-03 ENCOUNTER — APPOINTMENT (OUTPATIENT)
Dept: ULTRASOUND IMAGING | Facility: CLINIC | Age: 56
End: 2021-12-03
Payer: MEDICAID

## 2021-12-03 DIAGNOSIS — R10.12 LEFT UPPER QUADRANT PAIN: ICD-10-CM

## 2021-12-03 PROCEDURE — 76705 ECHO EXAM OF ABDOMEN: CPT | Mod: 26

## 2021-12-03 PROCEDURE — 76705 ECHO EXAM OF ABDOMEN: CPT

## 2022-01-19 ENCOUNTER — APPOINTMENT (OUTPATIENT)
Dept: INTERNAL MEDICINE | Facility: CLINIC | Age: 57
End: 2022-01-19

## 2022-02-15 ENCOUNTER — APPOINTMENT (OUTPATIENT)
Dept: RHEUMATOLOGY | Facility: CLINIC | Age: 57
End: 2022-02-15
Payer: MEDICAID

## 2022-02-15 VITALS
SYSTOLIC BLOOD PRESSURE: 117 MMHG | OXYGEN SATURATION: 96 % | WEIGHT: 108 LBS | DIASTOLIC BLOOD PRESSURE: 71 MMHG | TEMPERATURE: 96.8 F | HEIGHT: 63 IN | HEART RATE: 75 BPM | BODY MASS INDEX: 19.14 KG/M2 | RESPIRATION RATE: 14 BRPM

## 2022-02-15 DIAGNOSIS — H04.123 DRY EYE SYNDROME OF BILATERAL LACRIMAL GLANDS: ICD-10-CM

## 2022-02-15 DIAGNOSIS — M54.9 DORSALGIA, UNSPECIFIED: ICD-10-CM

## 2022-02-15 PROCEDURE — 99214 OFFICE O/P EST MOD 30 MIN: CPT

## 2022-02-16 LAB
25(OH)D3 SERPL-MCNC: 58.1 NG/ML
ALBUMIN SERPL ELPH-MCNC: 4.3 G/DL
ALP BLD-CCNC: 90 U/L
ALT SERPL-CCNC: 11 U/L
ANION GAP SERPL CALC-SCNC: 11 MMOL/L
APPEARANCE: CLEAR
AST SERPL-CCNC: 18 U/L
BACTERIA: NEGATIVE
BASOPHILS # BLD AUTO: 0.02 K/UL
BASOPHILS NFR BLD AUTO: 0.4 %
BILIRUB SERPL-MCNC: 0.3 MG/DL
BILIRUBIN URINE: NEGATIVE
BLOOD URINE: NEGATIVE
BUN SERPL-MCNC: 23 MG/DL
C3 SERPL-MCNC: 109 MG/DL
C4 SERPL-MCNC: 27 MG/DL
CALCIUM SERPL-MCNC: 9.4 MG/DL
CHLORIDE SERPL-SCNC: 107 MMOL/L
CK SERPL-CCNC: 109 U/L
CO2 SERPL-SCNC: 22 MMOL/L
COLOR: NORMAL
CREAT SERPL-MCNC: 1.21 MG/DL
CREAT SPEC-SCNC: 67 MG/DL
CREAT/PROT UR: 0.7 RATIO
EOSINOPHIL # BLD AUTO: 0.05 K/UL
EOSINOPHIL NFR BLD AUTO: 1.1 %
GLUCOSE QUALITATIVE U: NEGATIVE
HCT VFR BLD CALC: 36.3 %
HGB BLD-MCNC: 11.7 G/DL
HYALINE CASTS: 1 /LPF
IMM GRANULOCYTES NFR BLD AUTO: 0 %
KETONES URINE: NEGATIVE
LEUKOCYTE ESTERASE URINE: NEGATIVE
LYMPHOCYTES # BLD AUTO: 1.92 K/UL
LYMPHOCYTES NFR BLD AUTO: 42.9 %
MAN DIFF?: NORMAL
MCHC RBC-ENTMCNC: 29.6 PG
MCHC RBC-ENTMCNC: 32.2 GM/DL
MCV RBC AUTO: 91.9 FL
MICROSCOPIC-UA: NORMAL
MONOCYTES # BLD AUTO: 0.54 K/UL
MONOCYTES NFR BLD AUTO: 12.1 %
NEUTROPHILS # BLD AUTO: 1.95 K/UL
NEUTROPHILS NFR BLD AUTO: 43.5 %
NITRITE URINE: NEGATIVE
PH URINE: 6
PLATELET # BLD AUTO: 255 K/UL
POTASSIUM SERPL-SCNC: 4.7 MMOL/L
PROT SERPL-MCNC: 6.7 G/DL
PROT UR-MCNC: 48 MG/DL
PROTEIN URINE: ABNORMAL
RBC # BLD: 3.95 M/UL
RBC # FLD: 13.3 %
RED BLOOD CELLS URINE: 0 /HPF
SODIUM SERPL-SCNC: 140 MMOL/L
SPECIFIC GRAVITY URINE: 1.02
SQUAMOUS EPITHELIAL CELLS: 0 /HPF
TSH SERPL-ACNC: 4.64 UIU/ML
UROBILINOGEN URINE: NORMAL
WBC # FLD AUTO: 4.48 K/UL
WHITE BLOOD CELLS URINE: 0 /HPF

## 2022-02-17 ENCOUNTER — RX RENEWAL (OUTPATIENT)
Age: 57
End: 2022-02-17

## 2022-02-17 LAB — DSDNA AB SER-ACNC: <12 IU/ML

## 2022-02-21 ENCOUNTER — APPOINTMENT (OUTPATIENT)
Dept: RADIOLOGY | Facility: IMAGING CENTER | Age: 57
End: 2022-02-21
Payer: MEDICAID

## 2022-02-21 ENCOUNTER — OUTPATIENT (OUTPATIENT)
Dept: OUTPATIENT SERVICES | Facility: HOSPITAL | Age: 57
LOS: 1 days | End: 2022-02-21
Payer: MEDICAID

## 2022-02-21 DIAGNOSIS — M85.80 OTHER SPECIFIED DISORDERS OF BONE DENSITY AND STRUCTURE, UNSPECIFIED SITE: ICD-10-CM

## 2022-02-21 PROCEDURE — 77080 DXA BONE DENSITY AXIAL: CPT

## 2022-02-21 PROCEDURE — 77080 DXA BONE DENSITY AXIAL: CPT | Mod: 26

## 2022-03-19 ENCOUNTER — TRANSCRIPTION ENCOUNTER (OUTPATIENT)
Age: 57
End: 2022-03-19

## 2022-05-24 ENCOUNTER — NON-APPOINTMENT (OUTPATIENT)
Age: 57
End: 2022-05-24

## 2022-06-15 ENCOUNTER — APPOINTMENT (OUTPATIENT)
Dept: INTERNAL MEDICINE | Facility: CLINIC | Age: 57
End: 2022-06-15

## 2022-06-20 ENCOUNTER — APPOINTMENT (OUTPATIENT)
Dept: RHEUMATOLOGY | Facility: CLINIC | Age: 57
End: 2022-06-20

## 2022-08-08 ENCOUNTER — APPOINTMENT (OUTPATIENT)
Dept: RHEUMATOLOGY | Facility: CLINIC | Age: 57
End: 2022-08-08

## 2022-08-08 VITALS
HEART RATE: 65 BPM | SYSTOLIC BLOOD PRESSURE: 107 MMHG | DIASTOLIC BLOOD PRESSURE: 72 MMHG | BODY MASS INDEX: 18.78 KG/M2 | WEIGHT: 106 LBS | OXYGEN SATURATION: 95 % | HEIGHT: 63 IN | TEMPERATURE: 97.2 F

## 2022-08-08 DIAGNOSIS — K44.9 DIAPHRAGMATIC HERNIA W/OUT OBSTRUCTION OR GANGRENE: ICD-10-CM

## 2022-08-08 DIAGNOSIS — M34.1 CR(E)ST SYNDROME: ICD-10-CM

## 2022-08-08 DIAGNOSIS — K22.4 DYSKINESIA OF ESOPHAGUS: ICD-10-CM

## 2022-08-08 DIAGNOSIS — E03.9 HYPOTHYROIDISM, UNSPECIFIED: ICD-10-CM

## 2022-08-08 PROCEDURE — 99214 OFFICE O/P EST MOD 30 MIN: CPT

## 2022-08-09 LAB
25(OH)D3 SERPL-MCNC: 94.7 NG/ML
ALBUMIN SERPL ELPH-MCNC: 4.7 G/DL
ALP BLD-CCNC: 77 U/L
ALT SERPL-CCNC: 13 U/L
ANION GAP SERPL CALC-SCNC: 11 MMOL/L
APPEARANCE: CLEAR
AST SERPL-CCNC: 22 U/L
BACTERIA: NEGATIVE
BASOPHILS # BLD AUTO: 0.02 K/UL
BASOPHILS NFR BLD AUTO: 0.5 %
BILIRUB SERPL-MCNC: 0.6 MG/DL
BILIRUBIN URINE: NEGATIVE
BLOOD URINE: NEGATIVE
BUN SERPL-MCNC: 21 MG/DL
C3 SERPL-MCNC: 116 MG/DL
C4 SERPL-MCNC: 30 MG/DL
CALCIUM SERPL-MCNC: 10.5 MG/DL
CHLORIDE SERPL-SCNC: 103 MMOL/L
CK SERPL-CCNC: 53 U/L
CO2 SERPL-SCNC: 24 MMOL/L
COLOR: YELLOW
CREAT SERPL-MCNC: 1.15 MG/DL
CREAT SPEC-SCNC: 155 MG/DL
CREAT/PROT UR: 0.3 RATIO
DSDNA AB SER-ACNC: <12 IU/ML
EGFR: 55 ML/MIN/1.73M2
EOSINOPHIL # BLD AUTO: 0.03 K/UL
EOSINOPHIL NFR BLD AUTO: 0.8 %
GLUCOSE QUALITATIVE U: NEGATIVE
GLUCOSE SERPL-MCNC: 92 MG/DL
HCT VFR BLD CALC: 38.1 %
HGB BLD-MCNC: 12.3 G/DL
HYALINE CASTS: 0 /LPF
IMM GRANULOCYTES NFR BLD AUTO: 0.3 %
KETONES URINE: NEGATIVE
LEUKOCYTE ESTERASE URINE: NEGATIVE
LYMPHOCYTES # BLD AUTO: 1.99 K/UL
LYMPHOCYTES NFR BLD AUTO: 51.7 %
MAN DIFF?: NORMAL
MCHC RBC-ENTMCNC: 29.3 PG
MCHC RBC-ENTMCNC: 32.3 GM/DL
MCV RBC AUTO: 90.7 FL
MICROSCOPIC-UA: NORMAL
MONOCYTES # BLD AUTO: 0.47 K/UL
MONOCYTES NFR BLD AUTO: 12.2 %
NEUTROPHILS # BLD AUTO: 1.33 K/UL
NEUTROPHILS NFR BLD AUTO: 34.5 %
NITRITE URINE: NEGATIVE
PH URINE: 6.5
PLATELET # BLD AUTO: 258 K/UL
POTASSIUM SERPL-SCNC: 5.1 MMOL/L
PROT SERPL-MCNC: 7.3 G/DL
PROT UR-MCNC: 41 MG/DL
PROTEIN URINE: ABNORMAL
RBC # BLD: 4.2 M/UL
RBC # FLD: 12.8 %
RED BLOOD CELLS URINE: 1 /HPF
SODIUM SERPL-SCNC: 139 MMOL/L
SPECIFIC GRAVITY URINE: 1.02
SQUAMOUS EPITHELIAL CELLS: 0 /HPF
TSH SERPL-ACNC: 2.73 UIU/ML
UROBILINOGEN URINE: NORMAL
WBC # FLD AUTO: 3.85 K/UL
WHITE BLOOD CELLS URINE: 1 /HPF

## 2022-08-10 LAB
CENTROMERE IGG SER-ACNC: <0.2 CD:130001892
ENA RNP AB SER IA-ACNC: 2.4 AL
ENA SCL70 IGG SER IA-ACNC: <0.2 AL
ENA SM AB SER IA-ACNC: <0.2 AL
ENA SS-A AB SER IA-ACNC: <0.2 AL
ENA SS-B AB SER IA-ACNC: <0.2 AL

## 2022-08-12 LAB — RNA POLYMERASE III IGG: 7 UNITS

## 2022-08-14 ENCOUNTER — NON-APPOINTMENT (OUTPATIENT)
Age: 57
End: 2022-08-14

## 2022-09-03 ENCOUNTER — NON-APPOINTMENT (OUTPATIENT)
Age: 57
End: 2022-09-03

## 2022-09-09 ENCOUNTER — NON-APPOINTMENT (OUTPATIENT)
Age: 57
End: 2022-09-09

## 2022-10-05 NOTE — END OF VISIT
He confirms that his blood pressures are well controlled at home, readings in the 130s, blood pressure today 150/80 mm Hg.  His current regimen of treatment will continue.   [] : Resident

## 2022-12-20 ENCOUNTER — APPOINTMENT (OUTPATIENT)
Dept: RHEUMATOLOGY | Facility: CLINIC | Age: 57
End: 2022-12-20

## 2022-12-20 VITALS
BODY MASS INDEX: 17.19 KG/M2 | TEMPERATURE: 97.3 F | HEART RATE: 65 BPM | HEIGHT: 63 IN | DIASTOLIC BLOOD PRESSURE: 69 MMHG | RESPIRATION RATE: 16 BRPM | OXYGEN SATURATION: 98 % | SYSTOLIC BLOOD PRESSURE: 113 MMHG | WEIGHT: 97 LBS

## 2022-12-20 DIAGNOSIS — K21.9 GASTRO-ESOPHAGEAL REFLUX DISEASE W/OUT ESOPHAGITIS: ICD-10-CM

## 2022-12-20 DIAGNOSIS — Z79.899 OTHER LONG TERM (CURRENT) DRUG THERAPY: ICD-10-CM

## 2022-12-20 DIAGNOSIS — M79.10 MYALGIA, UNSPECIFIED SITE: ICD-10-CM

## 2022-12-20 PROCEDURE — 90686 IIV4 VACC NO PRSV 0.5 ML IM: CPT

## 2022-12-20 PROCEDURE — 90471 IMMUNIZATION ADMIN: CPT

## 2022-12-20 PROCEDURE — 99214 OFFICE O/P EST MOD 30 MIN: CPT | Mod: 25

## 2022-12-20 NOTE — DISCUSSION/SUMMARY
[FreeTextEntry1] : 1.  SLE: originally complicated by extra-renal manifestations and more significantly by lupus nephritis.  Received rituximab years ago as part of a research protocol and did quite well.  Maintained on CellCept (requiring suspension due to difficulty with pills).  SLE generally quiet without fever, rash, oral ulcers.  She continues with polyarthralgias, especially in the hands, knees, and hips.  \par 2.  Lupus nephritis:  maintained on CellCept and doing well for the most part.\par 3.  Chronic CellCept use:  has a lot of gastric symptoms, but it is unclear whether they are from CellCept.\par 4.  Hypothyroidism:  TSH has been repeatedly low.\par 5.  Depression:   has not returned.  Her mother is apparently living with her in her single room.  Her mother is having serious cardiac issues (age 86).  The patient was hospitalized on the psychiatry hurtado at [facility] at the end of April 2014 for depressive symptoms.  New medicines were started, and she was discharged to outpatient care. She has had difficulties contacting two psychiatrists that were recommended.  She has stopped all of the medicines prescribed by psychiatry. \par 6.  Headaches: persist and sometimes associated with vertigo.  Underwent an MRI and MRA of the brain.\par 7.  Back pain: persists in intermittent fashion.\par 8.  Abdominal pain - had appointment with GI; upper and lower endoscopies performed.  She was noted to have an esophageal web.  She has had elevated amylase and lipase values; this was evaluated by GI as well.  An ultrasound performed in April was unremarkable.  Endoscopy apparently demonstrated an esophageal stricture. She has returned regularly to GI.\par 9.  Esophageal web\par \par Plan\par 1.  Lab tests.\par 2.  GI f/u for elevated amylase and lipase and esophageal stricture\par 3.  Medicines renewed\par 4.  Return one month\par 5.  Prevnar 13 given\par

## 2022-12-20 NOTE — HISTORY OF PRESENT ILLNESS
[FreeTextEntry1] : 1.  SLE: originally complicated by extra-renal manifestations and more significantly by lupus nephritis.  Received rituximab years ago as part of a research protocol and did quite well.  Maintained on CellCept (requiring suspension due to difficulty with pills).  SLE generally quiet without fever, rash, oral ulcers.  She continues with intermittent polyarthralgias and myalgias, especially in the legs and hands.  Myalgias in the legs were present in late 2022. \par 2.  Lupus nephritis:  maintained on CellCept and doing well for the most part. With an increase in proteinuria in 2020 extending into 2021, a repeat kidney biopsy was probably necessary. However, her labs improved when next checked.\par 3.  Chronic CellCept use:  has a lot of gastric symptoms, but it is unclear whether they are from CellCept.\par 4.  Hypothyroidism:  TSH has been repeatedly checked.\par 5.  Depression:   has not returned.  Her mother is apparently living with her in her single room.  Her mother is having serious cardiac issues (age 86).  The patient was hospitalized on the psychiatry hurtado at [facility] at the end of April 2014 for depressive symptoms.  New medicines were started, and she was discharged to outpatient care. She has had difficulties contacting two psychiatrists that were recommended.  She has stopped all of the medicines prescribed by psychiatry. However, in the spring 2015 she recontacted psychiatry and has started some new medicines. She has been caring for her mother, age 87, and this has taken a toll on her. \par 6.  Headaches: persist and sometimes associated with vertigo.  Underwent an MRI and MRA of the brain in June 2016, which to my reading seemed OK except for some areas of abnormal signal.  She has been undergoing vestibular therapy during the summer/fall 2016. Neurology and ENT according to the patient have not found any abnormalities. She visited the ER in late July 2017 because of intense headache.  A CT scan was performed, and she had f/u with neurology. In the fall 2018 she visited the ER once again for headache.  An MRI was scheduled.  \par 7.  Back pain: persists in intermittent fashion.  The epicenter is the lower lumbar spine.  Pain is aggravated with forward flexion.  No bowel or bladder abnormalities. No radicular symptoms. The pain has been intermittent through 2016 and 2017.  While x-rays have been performed, an MRI has not. The pain resurfaced in the summer 2019. X-ray demonstrated degenerative disc disease in the lower lumbar spine. the pain persisted into 2021 and interferes with sleep.  An MRI was ordered. \par 8.  Abdominal pain - had appointment with GI; upper and lower endoscopies performed.  She was noted to have an esophageal web.  She has had elevated amylase and lipase values; this was evaluated by GI as well.  An ultrasound performed in April 2015 was unremarkable.  Endoscopy apparently demonstrated an esophageal stricture. She returned regularly to GI.  She then underwent evaluation to determine if there was pancreatic pathology as her amylase and lipase have been elevated for quite a while now.  During August 2015, she has had left-sided abdominal pain worse after eating.  \par 9.  Esophageal disease: She underwent an UGI endoscopy in the late summer 2019, which demonstrated low grade esophagitis and a 5 cm hiatus hernia.  Motility was quite abnormal. Surgery was planned on the hiatus hernia, but this was canceled.  Her esophageal motility abnormality was partially overcome by changing her dietary habits: small meals, upright as possible during sleep. \par 10.  Family history:  mother with pemphigoid (or pemphigus).  She then developed dementia, and Lilia is caring for her. \par 11.  Bone Health:  BMD in 2014.  Repeat in 2016 demonstrated osteopenia. BMD in Feb 2022 again demonstrated osteopenia. \par 12.  Right hip pain:  no trauma;  worse in the morning\par 13.  Syncope:  syncopal episode on September 4, 2015.  She was taken to the [facility] ER.  Evaluation focused on cardiac causes, and these tests were negative.  She did not have a brain imaging study nor was she seen by neurology.  There were no further studies.  It was assumed that she may have been hypoglycemic.  A seizure seemed unlikely. \par 14.  URI: onset of cough and rhinitis early January 2017; no fever.\par 15.  Social: mother who is living with her has Kaposi's\par 16.  Herpes simplex labialis: onset 3/1/19 right upper lip-experienced a lot of discomfort. This recurred in the spring 2021 and responded to anti-virals. \par 17.  Dog bite: to right hand in April 2019.  With topical care, it has been healing. \par 18.  Right thumb deformity: it is getting harder for her to function because of pain and laxity. \par 19.  UTI: dysuria in Nov 2019 for which she received Cipro.\par 20.  Weight loss: significant weight loss in 2022.  She stated that she was eating; no diarrhea. \par  \par Meds\par CellCept 500mg po 2xd\par Synthroid 0.05 mg/d\par omeprazole 20 mg 2xd\par Vit D\par Calcium\par  \par Vaccines\par PNVX 10/21/2013 ([id number]; exp Aug 6, 2014)\par PNVX 23  9/23/19  ([id number] exp 3/25/21)\par Prevnar 13 3/9/15 ([id number]; exp 5/16)\par Fluvirin 10/21/2013\par Fluzone Quadrivalent 10/6/2014 ( AD; exp 6/30/2015)\par Fluzone Quadrivalent 10/2/2015 ( AA; exp 6/30/2016)\par Fluzone Quadrivalent 11/13/17 (UT [id number] KA; exp 6/30/2018)\par Fluarix Quadrivalent  12/20/2022 ([id number]; exp 6/30/2023)\par

## 2022-12-20 NOTE — ASSESSMENT
[FreeTextEntry1] : 1.  SLE: originally complicated by extra-renal manifestations and more significantly by lupus nephritis.  Received rituximab years ago as part of a research protocol and did quite well.  Maintained on CellCept (requiring suspension due to difficulty with pills).  SLE generally quiet without fever, rash, oral ulcers.  She continues with intermittent polyarthralgias and myalgias, especially in the legs and hands.  Myalgias in the legs were present in late 2022. \par 2.  Lupus nephritis:  maintained on CellCept and doing well for the most part. With an increase in proteinuria in 2020 extending into 2021, a repeat kidney biopsy was probably necessary. However, her labs improved when next checked.\par 3.  Chronic CellCept use:  has a lot of gastric symptoms, but it is unclear whether they are from CellCept.\par 4.  Hypothyroidism:  TSH has been repeatedly checked.\par 5.  Depression:   has not returned.  Her mother is apparently living with her in her single room.  Her mother is having serious cardiac issues (age 86).  The patient was hospitalized on the psychiatry hurtado at [facility] at the end of April 2014 for depressive symptoms.  New medicines were started, and she was discharged to outpatient care. She has had difficulties contacting two psychiatrists that were recommended.  She has stopped all of the medicines prescribed by psychiatry. However, in the spring 2015 she recontacted psychiatry and has started some new medicines. She has been caring for her mother, age 87, and this has taken a toll on her. \par 6.  Headaches: persist and sometimes associated with vertigo.  Underwent an MRI and MRA of the brain in June 2016, which to my reading seemed OK except for some areas of abnormal signal.  She has been undergoing vestibular therapy during the summer/fall 2016. Neurology and ENT according to the patient have not found any abnormalities. She visited the ER in late July 2017 because of intense headache.  A CT scan was performed, and she had f/u with neurology. In the fall 2018 she visited the ER once again for headache.  An MRI was scheduled.  \par 7.  Back pain: persists in intermittent fashion.  The epicenter is the lower lumbar spine.  Pain is aggravated with forward flexion.  No bowel or bladder abnormalities. No radicular symptoms. The pain has been intermittent through 2016 and 2017.  While x-rays have been performed, an MRI has not. The pain resurfaced in the summer 2019. X-ray demonstrated degenerative disc disease in the lower lumbar spine. the pain persisted into 2021 and interferes with sleep.  An MRI was ordered. \par 8.  Abdominal pain - had appointment with GI; upper and lower endoscopies performed.  She was noted to have an esophageal web.  She has had elevated amylase and lipase values; this was evaluated by GI as well.  An ultrasound performed in April 2015 was unremarkable.  Endoscopy apparently demonstrated an esophageal stricture. She returned regularly to GI.  She then underwent evaluation to determine if there was pancreatic pathology as her amylase and lipase have been elevated for quite a while now.  During August 2015, she has had left-sided abdominal pain worse after eating.  \par 9.  Esophageal disease: She underwent an UGI endoscopy in the late summer 2019, which demonstrated low grade esophagitis and a 5 cm hiatus hernia.  Motility was quite abnormal. Surgery was planned on the hiatus hernia, but this was canceled.  Her esophageal motility abnormality was partially overcome by changing her dietary habits: small meals, upright as possible during sleep. \par 10.  Family history:  mother with pemphigoid (or pemphigus).  She then developed dementia, and Lilia is caring for her. \par 11.  Bone Health:  BMD in 2014.  Repeat in 2016 demonstrated osteopenia. BMD in Feb 2022 again demonstrated osteopenia. \par 12.  Right hip pain:  no trauma;  worse in the morning\par 13.  Syncope:  syncopal episode on September 4, 2015.  She was taken to the [facility] ER.  Evaluation focused on cardiac causes, and these tests were negative.  She did not have a brain imaging study nor was she seen by neurology.  There were no further studies.  It was assumed that she may have been hypoglycemic.  A seizure seemed unlikely. \par 14.  URI: onset of cough and rhinitis early January 2017; no fever.\par 15.  Social: mother who is living with her has Kaposi's\par 16.  Herpes simplex labialis: onset 3/1/19 right upper lip-experienced a lot of discomfort. This recurred in the spring 2021 and responded to anti-virals. \par 17.  Dog bite: to right hand in April 2019.  With topical care, it has been healing. \par 18.  Right thumb deformity: it is getting harder for her to function because of pain and laxity. \par 19.  UTI: dysuria in Nov 2019 for which she received Cipro.\par 20.  Weight loss: significant weight loss in 2022.  She stated that she was eating; no diarrhea. \par \par Plan:\par 1.  Lab tests\par 2.  Return 2-3 months\par 3.  Renew meds\par 4.  Shingrix vaccine recommended\par 5.  Flu vaccine IM left deltoid\par 6.  High risk medications used in the treatment of rheumatic diseases include steroids, disease modifying agents, immunosuppressive therapies, antimalarials, biologics, and chemotherapy. Regardless of which drug or class of drug, the potential toxicities of these therapies mandate close monitoring in the form of a history, physical, and laboratory tests.\par 7.  Systemic Lupus Erythematosus, known as lupus, is a chronic autoimmune disease that can affect any organ in the body posing threats to proper organ function and even to life. Therefore, close surveillance of all bodily functions is required, including but not limited to central and peripheral nervous system, ocular and auditory systems, cardiopulmonary function, kidney function, mucocutaneous and musculoskeletal systems as well as constitutional manifestations. Surveillance consists of history, physical, and laboratory tests. Treatment varies, but most of the drugs used are high risk and therefore also require close monitoring in the form of blood and urine tests.\par \par \par \par

## 2022-12-20 NOTE — PHYSICAL EXAM
[General Appearance - Alert] : alert [General Appearance - In No Acute Distress] : in no acute distress [General Appearance - Well Nourished] : well nourished [General Appearance - Well Developed] : well developed [Sclera] : the sclera and conjunctiva were normal [Extraocular Movements] : extraocular movements were intact [Outer Ear] : the ears and nose were normal in appearance [Neck Appearance] : the appearance of the neck was normal [Jugular Venous Distention Increased] : there was no jugular-venous distention [Auscultation Breath Sounds / Voice Sounds] : lungs were clear to auscultation bilaterally [Heart Rate And Rhythm] : heart rate was normal and rhythm regular [Heart Sounds] : normal S1 and S2 [Heart Sounds Gallop] : no gallops [Murmurs] : no murmurs [Heart Sounds Pericardial Friction Rub] : no pericardial rub [Arterial Pulses Carotid] : carotid pulses were normal with no bruits [Edema] : there was no peripheral edema [Veins - Varicosity Changes] : there were no varicosital changes [Bowel Sounds] : normal bowel sounds [Abdomen Soft] : soft [Cervical Lymph Nodes Enlarged Posterior Bilaterally] : posterior cervical [Cervical Lymph Nodes Enlarged Anterior Bilaterally] : anterior cervical [Supraclavicular Lymph Nodes Enlarged Bilaterally] : supraclavicular [No CVA Tenderness] : no ~M costovertebral angle tenderness [No Spinal Tenderness] : no spinal tenderness [Abnormal Walk] : normal gait [Nail Clubbing] : no clubbing  or cyanosis of the fingernails [Motor Tone] : muscle strength and tone were normal [FreeTextEntry1] : right thumb deformity [] : no rash [Sensation] : the sensory exam was normal to light touch and pinprick [Motor Exam] : the motor exam was normal [Oriented To Time, Place, And Person] : oriented to person, place, and time [Affect] : the affect was normal

## 2022-12-21 LAB
25(OH)D3 SERPL-MCNC: 61.5 NG/ML
ALBUMIN SERPL ELPH-MCNC: 4.4 G/DL
ALP BLD-CCNC: 81 U/L
ALT SERPL-CCNC: 12 U/L
ANION GAP SERPL CALC-SCNC: 13 MMOL/L
APPEARANCE: CLEAR
AST SERPL-CCNC: 20 U/L
BACTERIA: NEGATIVE
BASOPHILS # BLD AUTO: 0.02 K/UL
BASOPHILS NFR BLD AUTO: 0.5 %
BILIRUB SERPL-MCNC: 0.4 MG/DL
BILIRUBIN URINE: NEGATIVE
BLOOD URINE: NEGATIVE
BUN SERPL-MCNC: 26 MG/DL
C3 SERPL-MCNC: 98 MG/DL
C4 SERPL-MCNC: 26 MG/DL
CALCIUM SERPL-MCNC: 9.9 MG/DL
CHLORIDE SERPL-SCNC: 100 MMOL/L
CK SERPL-CCNC: 43 U/L
CO2 SERPL-SCNC: 22 MMOL/L
COLOR: NORMAL
CREAT SERPL-MCNC: 1.13 MG/DL
CREAT SPEC-SCNC: 54 MG/DL
CREAT/PROT UR: 0.3 RATIO
EGFR: 56 ML/MIN/1.73M2
EOSINOPHIL # BLD AUTO: 0.01 K/UL
EOSINOPHIL NFR BLD AUTO: 0.3 %
GLUCOSE QUALITATIVE U: NEGATIVE
HCT VFR BLD CALC: 34.4 %
HGB BLD-MCNC: 11.2 G/DL
HYALINE CASTS: 0 /LPF
IMM GRANULOCYTES NFR BLD AUTO: 0.3 %
KETONES URINE: NEGATIVE
LEUKOCYTE ESTERASE URINE: NEGATIVE
LYMPHOCYTES # BLD AUTO: 1.54 K/UL
LYMPHOCYTES NFR BLD AUTO: 39.7 %
MAN DIFF?: NORMAL
MCHC RBC-ENTMCNC: 29.4 PG
MCHC RBC-ENTMCNC: 32.6 GM/DL
MCV RBC AUTO: 90.3 FL
MICROSCOPIC-UA: NORMAL
MONOCYTES # BLD AUTO: 0.37 K/UL
MONOCYTES NFR BLD AUTO: 9.5 %
NEUTROPHILS # BLD AUTO: 1.93 K/UL
NEUTROPHILS NFR BLD AUTO: 49.7 %
NITRITE URINE: NEGATIVE
PH URINE: 6.5
PLATELET # BLD AUTO: 259 K/UL
POTASSIUM SERPL-SCNC: 4.7 MMOL/L
PROT SERPL-MCNC: 6.9 G/DL
PROT UR-MCNC: 18 MG/DL
PROTEIN URINE: NORMAL
RBC # BLD: 3.81 M/UL
RBC # FLD: 12.9 %
RED BLOOD CELLS URINE: 1 /HPF
SODIUM SERPL-SCNC: 135 MMOL/L
SPECIFIC GRAVITY URINE: 1.01
SQUAMOUS EPITHELIAL CELLS: 0 /HPF
TSH SERPL-ACNC: 3.8 UIU/ML
UROBILINOGEN URINE: NORMAL
WBC # FLD AUTO: 3.88 K/UL
WHITE BLOOD CELLS URINE: 0 /HPF

## 2022-12-22 LAB — DSDNA AB SER-ACNC: <12 IU/ML

## 2022-12-30 ENCOUNTER — APPOINTMENT (OUTPATIENT)
Dept: MAMMOGRAPHY | Facility: CLINIC | Age: 57
End: 2022-12-30
Payer: MEDICAID

## 2022-12-30 ENCOUNTER — RESULT REVIEW (OUTPATIENT)
Age: 57
End: 2022-12-30

## 2022-12-30 ENCOUNTER — NON-APPOINTMENT (OUTPATIENT)
Age: 57
End: 2022-12-30

## 2022-12-30 PROCEDURE — 77063 BREAST TOMOSYNTHESIS BI: CPT

## 2022-12-30 PROCEDURE — 77067 SCR MAMMO BI INCL CAD: CPT

## 2023-01-15 ENCOUNTER — NON-APPOINTMENT (OUTPATIENT)
Age: 58
End: 2023-01-15

## 2023-05-02 ENCOUNTER — APPOINTMENT (OUTPATIENT)
Dept: RHEUMATOLOGY | Facility: CLINIC | Age: 58
End: 2023-05-02
Payer: MEDICAID

## 2023-05-02 VITALS
OXYGEN SATURATION: 98 % | RESPIRATION RATE: 16 BRPM | SYSTOLIC BLOOD PRESSURE: 121 MMHG | HEART RATE: 63 BPM | DIASTOLIC BLOOD PRESSURE: 78 MMHG

## 2023-05-02 DIAGNOSIS — M19.049 PRIMARY OSTEOARTHRITIS, UNSPECIFIED HAND: ICD-10-CM

## 2023-05-02 DIAGNOSIS — Z79.899 OTHER LONG TERM (CURRENT) DRUG THERAPY: ICD-10-CM

## 2023-05-02 DIAGNOSIS — R22.31 LOCALIZED SWELLING, MASS AND LUMP, RIGHT UPPER LIMB: ICD-10-CM

## 2023-05-02 DIAGNOSIS — M32.9 SYSTEMIC LUPUS ERYTHEMATOSUS, UNSPECIFIED: ICD-10-CM

## 2023-05-02 DIAGNOSIS — M32.14 GLOMERULAR DISEASE IN SYSTEMIC LUPUS ERYTHEMATOSUS: ICD-10-CM

## 2023-05-02 PROCEDURE — 99214 OFFICE O/P EST MOD 30 MIN: CPT

## 2023-05-02 NOTE — ASSESSMENT
[FreeTextEntry1] : 1.  SLE: originally complicated by extra-renal manifestations and more significantly by lupus nephritis.  Received rituximab years ago as part of a research protocol and did quite well.  Maintained on CellCept (requiring suspension due to difficulty with pills).  SLE generally quiet without fever, rash, oral ulcers.  She continues with intermittent polyarthralgias and myalgias, especially in the legs and hands.  Myalgias in the legs were present in late 2022. \par 2.  Lupus nephritis:  maintained on CellCept and doing well for the most part. With an increase in proteinuria in 2020 extending into 2021, a repeat kidney biopsy was probably necessary. However, her labs improved when next checked.\par 3.  Chronic CellCept use:  has a lot of gastric symptoms, but it is unclear whether they are from CellCept.\par 4.  Hypothyroidism:  TSH has been repeatedly checked.\par 5.  Depression:   has not returned.  Her mother is apparently living with her in her single room.  Her mother is having serious cardiac issues (age 86).  The patient was hospitalized on the psychiatry hurtado at [facility] at the end of April 2014 for depressive symptoms.  New medicines were started, and she was discharged to outpatient care. She has had difficulties contacting two psychiatrists that were recommended.  She has stopped all of the medicines prescribed by psychiatry. However, in the spring 2015 she recontacted psychiatry and has started some new medicines. She has been caring for her mother, age 87, and this has taken a toll on her. \par 6.  Headaches: persist and sometimes associated with vertigo.  Underwent an MRI and MRA of the brain in June 2016, which to my reading seemed OK except for some areas of abnormal signal.  She has been undergoing vestibular therapy during the summer/fall 2016. Neurology and ENT according to the patient have not found any abnormalities. She visited the ER in late July 2017 because of intense headache.  A CT scan was performed, and she had f/u with neurology. In the fall 2018 she visited the ER once again for headache.  An MRI was scheduled.  \par 7.  Back pain: persists in intermittent fashion.  The epicenter is the lower lumbar spine.  Pain is aggravated with forward flexion.  No bowel or bladder abnormalities. No radicular symptoms. The pain has been intermittent through 2016 and 2017.  While x-rays have been performed, an MRI has not. The pain resurfaced in the summer 2019. X-ray demonstrated degenerative disc disease in the lower lumbar spine. the pain persisted into 2021 and interferes with sleep.  An MRI was ordered. \par 8.  Abdominal pain - had appointment with GI; upper and lower endoscopies performed.  She was noted to have an esophageal web.  She has had elevated amylase and lipase values; this was evaluated by GI as well.  An ultrasound performed in April 2015 was unremarkable.  Endoscopy apparently demonstrated an esophageal stricture. She returned regularly to GI.  She then underwent evaluation to determine if there was pancreatic pathology as her amylase and lipase have been elevated for quite a while now.  During August 2015, she has had left-sided abdominal pain worse after eating.  \par 9.  Esophageal disease: She underwent an UGI endoscopy in the late summer 2019, which demonstrated low grade esophagitis and a 5 cm hiatus hernia.  Motility was quite abnormal. Surgery was planned on the hiatus hernia, but this was canceled.  Her esophageal motility abnormality was partially overcome by changing her dietary habits: small meals, upright as possible during sleep. \par 10.  Family history:  mother with pemphigoid (or pemphigus).  She then developed dementia, and Lilia is caring for her. \par 11.  Bone Health:  BMD in 2014.  Repeat in 2016 demonstrated osteopenia. BMD in Feb 2022 again demonstrated osteopenia. \par 12.  Right hip pain:  no trauma;  worse in the morning\par 13.  Syncope:  syncopal episode on September 4, 2015.  She was taken to the [facility] ER.  Evaluation focused on cardiac causes, and these tests were negative.  She did not have a brain imaging study nor was she seen by neurology.  There were no further studies.  It was assumed that she may have been hypoglycemic.  A seizure seemed unlikely. \par 14.  URI: onset of cough and rhinitis early January 2017; no fever.\par 15.  Social: mother who is living with her has Kaposi's\par 16.  Herpes simplex labialis: onset 3/1/19 right upper lip-experienced a lot of discomfort. This recurred in the spring 2021 and responded to anti-virals. \par 17.  Dog bite: to right hand in April 2019.  With topical care, it has been healing. \par 18.  Right thumb deformity: it is getting harder for her to function because of pain and laxity. \par 19.  UTI: dysuria in Nov 2019 for which she received Cipro.\par 20.  Weight loss: significant weight loss in 2022.  She stated that she was eating; no diarrhea. \par 21.  Mass IP right thumb: painful and tender mass for which x-rays were ordered in 2023. \par \par Plan:\par 1.  Lab tests\par 2.  Return 2-3 months\par 3.  Renew meds\par 4.  Shingrix vaccine recommended\par 5.  X-ray right hand\par 6.  High risk medications used in the treatment of rheumatic diseases include steroids, disease modifying agents, immunosuppressive therapies, antimalarials, biologics, and chemotherapy. Regardless of which drug or class of drug, the potential toxicities of these therapies mandate close monitoring in the form of a history, physical, and laboratory tests.\par 7.  Systemic Lupus Erythematosus, known as lupus, is a chronic autoimmune disease that can affect any organ in the body posing threats to proper organ function and even to life. Therefore, close surveillance of all bodily functions is required, including but not limited to central and peripheral nervous system, ocular and auditory systems, cardiopulmonary function, kidney function, mucocutaneous and musculoskeletal systems as well as constitutional manifestations. Surveillance consists of history, physical, and laboratory tests. Treatment varies, but most of the drugs used are high risk and therefore also require close monitoring in the form of blood and urine tests.\par \par \par \par

## 2023-05-02 NOTE — HISTORY OF PRESENT ILLNESS
[FreeTextEntry1] : 1.  SLE: originally complicated by extra-renal manifestations and more significantly by lupus nephritis.  Received rituximab years ago as part of a research protocol and did quite well.  Maintained on CellCept (requiring suspension due to difficulty with pills).  SLE generally quiet without fever, rash, oral ulcers.  She continues with intermittent polyarthralgias and myalgias, especially in the legs and hands.  Myalgias in the legs were present in late 2022. \par 2.  Lupus nephritis:  maintained on CellCept and doing well for the most part. With an increase in proteinuria in 2020 extending into 2021, a repeat kidney biopsy was probably necessary. However, her labs improved when next checked.\par 3.  Chronic CellCept use:  has a lot of gastric symptoms, but it is unclear whether they are from CellCept.\par 4.  Hypothyroidism:  TSH has been repeatedly checked.\par 5.  Depression:   has not returned.  Her mother is apparently living with her in her single room.  Her mother is having serious cardiac issues (age 86).  The patient was hospitalized on the psychiatry hurtado at [facility] at the end of April 2014 for depressive symptoms.  New medicines were started, and she was discharged to outpatient care. She has had difficulties contacting two psychiatrists that were recommended.  She has stopped all of the medicines prescribed by psychiatry. However, in the spring 2015 she recontacted psychiatry and has started some new medicines. She has been caring for her mother, age 87, and this has taken a toll on her. \par 6.  Headaches: persist and sometimes associated with vertigo.  Underwent an MRI and MRA of the brain in June 2016, which to my reading seemed OK except for some areas of abnormal signal.  She has been undergoing vestibular therapy during the summer/fall 2016. Neurology and ENT according to the patient have not found any abnormalities. She visited the ER in late July 2017 because of intense headache.  A CT scan was performed, and she had f/u with neurology. In the fall 2018 she visited the ER once again for headache.  An MRI was scheduled.  \par 7.  Back pain: persists in intermittent fashion.  The epicenter is the lower lumbar spine.  Pain is aggravated with forward flexion.  No bowel or bladder abnormalities. No radicular symptoms. The pain has been intermittent through 2016 and 2017.  While x-rays have been performed, an MRI has not. The pain resurfaced in the summer 2019. X-ray demonstrated degenerative disc disease in the lower lumbar spine. the pain persisted into 2021 and interferes with sleep.  An MRI was ordered. \par 8.  Abdominal pain - had appointment with GI; upper and lower endoscopies performed.  She was noted to have an esophageal web.  She has had elevated amylase and lipase values; this was evaluated by GI as well.  An ultrasound performed in April 2015 was unremarkable.  Endoscopy apparently demonstrated an esophageal stricture. She returned regularly to GI.  She then underwent evaluation to determine if there was pancreatic pathology as her amylase and lipase have been elevated for quite a while now.  During August 2015, she has had left-sided abdominal pain worse after eating.  \par 9.  Esophageal disease: She underwent an UGI endoscopy in the late summer 2019, which demonstrated low grade esophagitis and a 5 cm hiatus hernia.  Motility was quite abnormal. Surgery was planned on the hiatus hernia, but this was canceled.  Her esophageal motility abnormality was partially overcome by changing her dietary habits: small meals, upright as possible during sleep. \par 10.  Family history:  mother with pemphigoid (or pemphigus).  She then developed dementia, and Lilia is caring for her. \par 11.  Bone Health:  BMD in 2014.  Repeat in 2016 demonstrated osteopenia. BMD in Feb 2022 again demonstrated osteopenia. \par 12.  Right hip pain:  no trauma;  worse in the morning\par 13.  Syncope:  syncopal episode on September 4, 2015.  She was taken to the [facility] ER.  Evaluation focused on cardiac causes, and these tests were negative.  She did not have a brain imaging study nor was she seen by neurology.  There were no further studies.  It was assumed that she may have been hypoglycemic.  A seizure seemed unlikely. \par 14.  URI: onset of cough and rhinitis early January 2017; no fever.\par 15.  Social: mother who is living with her has Kaposi's\par 16.  Herpes simplex labialis: onset 3/1/19 right upper lip-experienced a lot of discomfort. This recurred in the spring 2021 and responded to anti-virals. \par 17.  Dog bite: to right hand in April 2019.  With topical care, it has been healing. \par 18.  Right thumb deformity: it is getting harder for her to function because of pain and laxity. \par 19.  UTI: dysuria in Nov 2019 for which she received Cipro.\par 20.  Weight loss: significant weight loss in 2022.  She stated that she was eating; no diarrhea. \par 21.  Mass IP right thumb: painful and tender mass for which x-rays were ordered in 2023. \par  \par Meds\par CellCept 500mg po 2xd\par Synthroid 0.05 mg/d\par omeprazole 20 mg 2xd\par Vit D\par Calcium\par  \par Vaccines\par PNVX 10/21/2013 ([id number]; exp Aug 6, 2014)\par PNVX 23  9/23/19  ([id number] exp 3/25/21)\par Prevnar 13 3/9/15 ([id number]; exp 5/16)\par Fluvirin 10/21/2013\par Fluzone Quadrivalent 10/6/2014 ( AD; exp 6/30/2015)\par Fluzone Quadrivalent 10/2/2015 ( AA; exp 6/30/2016)\par Fluzone Quadrivalent 11/13/17 (UT [id number]; exp 6/30/2018)\par Fluarix Quadrivalent  12/20/2022 ([id number]; exp 6/30/2023)\par

## 2023-05-02 NOTE — DISCUSSION/SUMMARY
[FreeTextEntry1] : 1.  SLE: originally complicated by extra-renal manifestations and more significantly by lupus nephritis.  Received rituximab years ago as part of a research protocol and did quite well.  Maintained on CellCept (requiring suspension due to difficulty with pills).  SLE generally quiet without fever, rash, oral ulcers.  She continues with polyarthralgias, especially in the hands, knees, and hips.  \par 2.  Lupus nephritis:  maintained on CellCept and doing well for the most part.\par 3.  Chronic CellCept use:  has a lot of gastric symptoms, but it is unclear whether they are from CellCept.\par 4.  Hypothyroidism:  TSH has been repeatedly low.\par 5.  Depression:   has not returned.  Her mother is apparently living with her in her single room.  Her mother is having serious cardiac issues (age 86).  The patient was hospitalized on the psychiatry hurtado at [facility] at the end of April 2014 for depressive symptoms.  New medicines were started, and she was discharged to outpatient care. She has had difficulties contacting two psychiatrists that were recommended.  She has stopped all of the medicines prescribed by psychiatry. \par 6.  Headaches: persist and sometimes associated with vertigo.  Underwent an MRI and MRA of the brain.\par 7.  Back pain: persists in intermittent fashion.\par 8.  Abdominal pain - had appointment with GI; upper and lower endoscopies performed.  She was noted to have an esophageal web.  She has had elevated amylase and lipase values; this was evaluated by GI as well.  An ultrasound performed in April was unremarkable.  Endoscopy apparently demonstrated an esophageal stricture. She has returned regularly to GI.\par 9.  Esophageal web\par \par Plan\par 1.  Lab tests.\par 2.  GI f/u for elevated amylase and lipase and esophageal stricture\par 3.  Medicines renewed\par 4.  Return one month\par 5.  Prevnar 13 given\par

## 2023-05-03 ENCOUNTER — OUTPATIENT (OUTPATIENT)
Dept: OUTPATIENT SERVICES | Facility: HOSPITAL | Age: 58
LOS: 1 days | End: 2023-05-03
Payer: MEDICAID

## 2023-05-03 ENCOUNTER — APPOINTMENT (OUTPATIENT)
Dept: INTERNAL MEDICINE | Facility: CLINIC | Age: 58
End: 2023-05-03
Payer: MEDICAID

## 2023-05-03 ENCOUNTER — APPOINTMENT (OUTPATIENT)
Dept: RADIOLOGY | Facility: CLINIC | Age: 58
End: 2023-05-03
Payer: MEDICAID

## 2023-05-03 VITALS
WEIGHT: 94 LBS | HEART RATE: 74 BPM | BODY MASS INDEX: 16.66 KG/M2 | DIASTOLIC BLOOD PRESSURE: 70 MMHG | SYSTOLIC BLOOD PRESSURE: 116 MMHG | OXYGEN SATURATION: 99 % | HEIGHT: 63 IN

## 2023-05-03 DIAGNOSIS — M19.049 PRIMARY OSTEOARTHRITIS, UNSPECIFIED HAND: ICD-10-CM

## 2023-05-03 DIAGNOSIS — I10 ESSENTIAL (PRIMARY) HYPERTENSION: ICD-10-CM

## 2023-05-03 DIAGNOSIS — R63.4 ABNORMAL WEIGHT LOSS: ICD-10-CM

## 2023-05-03 DIAGNOSIS — E83.51 HYPOCALCEMIA: ICD-10-CM

## 2023-05-03 DIAGNOSIS — Z00.00 ENCOUNTER FOR GENERAL ADULT MEDICAL EXAMINATION W/OUT ABNORMAL FINDINGS: ICD-10-CM

## 2023-05-03 DIAGNOSIS — F32.A DEPRESSION, UNSPECIFIED: ICD-10-CM

## 2023-05-03 DIAGNOSIS — K59.00 CONSTIPATION, UNSPECIFIED: ICD-10-CM

## 2023-05-03 LAB
25(OH)D3 SERPL-MCNC: 56.3 NG/ML
ALBUMIN SERPL ELPH-MCNC: 4.2 G/DL
ALP BLD-CCNC: 70 U/L
ALT SERPL-CCNC: 9 U/L
ANION GAP SERPL CALC-SCNC: 11 MMOL/L
APPEARANCE: CLEAR
AST SERPL-CCNC: 19 U/L
BACTERIA: NEGATIVE /HPF
BASOPHILS # BLD AUTO: 0.02 K/UL
BASOPHILS NFR BLD AUTO: 0.4 %
BILIRUB SERPL-MCNC: 0.4 MG/DL
BILIRUBIN URINE: NEGATIVE
BLOOD URINE: NEGATIVE
BUN SERPL-MCNC: 26 MG/DL
C3 SERPL-MCNC: 89 MG/DL
C4 SERPL-MCNC: 24 MG/DL
CALCIUM SERPL-MCNC: 9.4 MG/DL
CAST: 0 /LPF
CHLORIDE SERPL-SCNC: 103 MMOL/L
CK SERPL-CCNC: 49 U/L
CO2 SERPL-SCNC: 23 MMOL/L
COLOR: YELLOW
CREAT SERPL-MCNC: 1.03 MG/DL
DSDNA AB SER-ACNC: 12 IU/ML
EGFR: 63 ML/MIN/1.73M2
ENA RNP AB SER IA-ACNC: 2.1 AL
ENA SM AB SER IA-ACNC: <0.2 AL
ENA SS-A AB SER IA-ACNC: <0.2 AL
ENA SS-B AB SER IA-ACNC: <0.2 AL
EOSINOPHIL # BLD AUTO: 0.02 K/UL
EOSINOPHIL NFR BLD AUTO: 0.4 %
EPITHELIAL CELLS: 0 /HPF
GLUCOSE QUALITATIVE U: NEGATIVE MG/DL
HCT VFR BLD CALC: 34.4 %
HGB BLD-MCNC: 11.3 G/DL
IMM GRANULOCYTES NFR BLD AUTO: 0.2 %
KETONES URINE: NEGATIVE MG/DL
LEUKOCYTE ESTERASE URINE: NEGATIVE
LYMPHOCYTES # BLD AUTO: 1.49 K/UL
LYMPHOCYTES NFR BLD AUTO: 30.8 %
MAN DIFF?: NORMAL
MCHC RBC-ENTMCNC: 29.6 PG
MCHC RBC-ENTMCNC: 32.8 GM/DL
MCV RBC AUTO: 90.1 FL
MICROSCOPIC-UA: NORMAL
MONOCYTES # BLD AUTO: 0.37 K/UL
MONOCYTES NFR BLD AUTO: 7.6 %
NEUTROPHILS # BLD AUTO: 2.93 K/UL
NEUTROPHILS NFR BLD AUTO: 60.6 %
NITRITE URINE: NEGATIVE
PH URINE: 6
PLATELET # BLD AUTO: 199 K/UL
POTASSIUM SERPL-SCNC: 4.5 MMOL/L
PROT SERPL-MCNC: 6.6 G/DL
PROTEIN URINE: 30 MG/DL
RBC # BLD: 3.82 M/UL
RBC # FLD: 13.2 %
RED BLOOD CELLS URINE: 0 /HPF
SODIUM SERPL-SCNC: 137 MMOL/L
SPECIFIC GRAVITY URINE: 1.01
TSH SERPL-ACNC: 2.8 UIU/ML
URATE SERPL-MCNC: 4.9 MG/DL
UROBILINOGEN URINE: 0.2 MG/DL
WBC # FLD AUTO: 4.84 K/UL
WHITE BLOOD CELLS URINE: 0 /HPF

## 2023-05-03 PROCEDURE — 99396 PREV VISIT EST AGE 40-64: CPT | Mod: GE

## 2023-05-03 PROCEDURE — T1013: CPT

## 2023-05-03 PROCEDURE — 73130 X-RAY EXAM OF HAND: CPT

## 2023-05-03 PROCEDURE — 36415 COLL VENOUS BLD VENIPUNCTURE: CPT

## 2023-05-03 PROCEDURE — G0463: CPT

## 2023-05-03 PROCEDURE — 73130 X-RAY EXAM OF HAND: CPT | Mod: 26,RT

## 2023-05-03 PROCEDURE — 83036 HEMOGLOBIN GLYCOSYLATED A1C: CPT

## 2023-05-03 PROCEDURE — 80061 LIPID PANEL: CPT

## 2023-05-03 RX ORDER — CALCIUM CARBONATE/VITAMIN D3 600 MG-10
600-10 TABLET ORAL DAILY
Qty: 30 | Refills: 3 | Status: ACTIVE | COMMUNITY
Start: 2023-05-03 | End: 1900-01-01

## 2023-05-03 RX ORDER — POLYETHYLENE GLYCOL 3350 17 G/17G
17 POWDER, FOR SOLUTION ORAL TWICE DAILY
Qty: 60 | Refills: 3 | Status: ACTIVE | COMMUNITY
Start: 2023-05-03 | End: 1900-01-01

## 2023-05-03 RX ORDER — SENNOSIDES 8.6 MG/1
8.6 CAPSULE, GELATIN COATED ORAL
Qty: 30 | Refills: 3 | Status: ACTIVE | COMMUNITY
Start: 2023-05-03 | End: 1900-01-01

## 2023-05-03 RX ORDER — ZOSTER VACCINE RECOMBINANT, ADJUVANTED 50 MCG/0.5
50 KIT INTRAMUSCULAR
Qty: 1 | Refills: 0 | Status: ACTIVE | COMMUNITY
Start: 2023-05-03 | End: 1900-01-01

## 2023-05-03 RX ORDER — TOPIRAMATE 100 MG/1
100 TABLET, FILM COATED ORAL DAILY
Refills: 0 | Status: DISCONTINUED | COMMUNITY
Start: 2021-03-03 | End: 2023-05-03

## 2023-05-03 RX ORDER — VALACYCLOVIR 1 G/1
1 TABLET, FILM COATED ORAL
Qty: 4 | Refills: 2 | Status: DISCONTINUED | COMMUNITY
Start: 2018-11-21 | End: 2023-05-03

## 2023-05-03 NOTE — PAST MEDICAL HISTORY
[Postmenopausal] : postmenopausal [Definite ___ (Date)] : the last menstrual period was [unfilled] [Approximately ___] : the LMP was approximately [unfilled]

## 2023-05-05 ENCOUNTER — NON-APPOINTMENT (OUTPATIENT)
Age: 58
End: 2023-05-05

## 2023-05-05 LAB
CHOLEST SERPL-MCNC: 178 MG/DL
ESTIMATED AVERAGE GLUCOSE: 117 MG/DL
HBA1C MFR BLD HPLC: 5.7 %
HDLC SERPL-MCNC: 73 MG/DL
LDLC SERPL CALC-MCNC: 86 MG/DL
NONHDLC SERPL-MCNC: 105 MG/DL
TRIGL SERPL-MCNC: 93 MG/DL

## 2023-05-07 NOTE — PHYSICAL EXAM
[Normal] : affect was normal and insight and judgment were intact [No Acute Distress] : no acute distress [Cachectic] : cachexia was observed [Soft] : abdomen soft [Non-distended] : non-distended [Normal Bowel Sounds] : normal bowel sounds [No Joint Swelling] : no joint swelling [Grossly Normal Strength/Tone] : grossly normal strength/tone [de-identified] : diffusely tender to light palpation, soft but can appreciate stool burden, no rebound no guarding.  [de-identified] : +swan neck deformity

## 2023-05-07 NOTE — HISTORY OF PRESENT ILLNESS
[FreeTextEntry1] : cpe [de-identified] : 58F never smoker, w/ hx of Lupus and Lupus nephritis, depression, hypothyroidism presents for CPE. Patient reports that she is not feeling well. Reports that she has been under a lot of stress lately due to being the sole caretaker of her mother (both brothers are working) and unable to take care of herself. Has lost a lot of weight, wondering if she should go to endocrinology and nutrition. \par \par # tired and losing weight \par - started last year, after mom went to nursing home. unable to take care of herself because of that. lost over 20 pounds over the last 8 months. doesn't have time to eat or prepare food, no kitchen. usually resorts to fast food, or eats canned veggies and beans. There is no stove at her place of residence, has to microwave food, usually eats frozen food, small minifridge. denies f/c. unsure about night sweats, reports that she has nightmares sometimes and wakes up in cold sweat in the middle of the night or in the morning. no cough or sob, no sick contacts or recent travel. no personal hx of cancer. no fam hx cancer. reports she immigrated to the [place] long time ago. quant gold 12/2019 neg\par - also notes epigastric pain and difficulty swallowing. +heartburn not well controlled by omeprazole. evaluated by GI, noted to have hernia and esophageal dysmotility 2/2 possible scleroderma. recent RUQ neg. Also has constipation, hasn't had a bowel movement in a week, when she does has to strain and is painful
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
## 2023-05-07 NOTE — ASSESSMENT
[FreeTextEntry1] : 58F never smoker, w/ hx of Lupus and Lupus nephritis, depression, hypothyroidism, hiatal hernia, esophageal dysmotility 2/2 ?scleroderma presents for CPE found to have 20 pound weight loss over the last 8 months. \par \par # weight loss\par - likely iso nutritional deficiency, less likely but also possible malignancy given ?night sweats and abomdinal pain. quant gold neg in 2019. hypothyroidism stable TSH 2.8 on 2023\par - CTAP w/wo con to assess for intraabdominal pathology\par - f/u GI for possible colonoscopy (due in )\par - task SW Amie Sloan for community assistance with food access\par - MVI, calcium, vit D supplementation\par - patient requesting Endo appt, can consider at next visit pending CT\par \par # depression, uncontrolled, PHQ9 17\par - denies active SI/HI/AVH\par - mood "poor", thoughts are linear and goal oriented, speech not pressured\par - pt follows up with Dr. Nacho Hurst, told patient to reach out to psyc to consider increase sertraline dose vs adding on wellbutrin\par - c/w sertraline 100 mg daily\par - counseled to report to ED if with active SI\par \par # constipation\par - miralax and senna\par \par #HCM \par --CBC, CMP, Lipid panel, A1c \par -- flu shot 2022 \par -- pneumovax 2019 \par --tdap 3/2021 \par [ ] shingles \par [ ] HIV\par --Cervical Cancer screenin2019 cotesting neg \par --Breast Ca screenin2022 BIRADS1 \par [ ] Osteoporosis, address DEXA scan at next visit\par [ ] Colon Cancer patient reports normal colonoscopy in . will refer to GI again as next due in  and significant weight loss. 10/2015 FOBT + \par \par RTC 5 weeks for f/u weight loss after obtaining CTAP\par d/w Dr. Gaviria\par Silvia Lowry MD\par Internal Medicine, PGY-2

## 2023-05-07 NOTE — INTERPRETER SERVICES
[Other: ______] : provided by SERGIO [Time Spent: ____ minutes] : Total time spent using  services: [unfilled] minutes. The patient's primary language is not English thus required  services. [Interpreters_IDNumber] : [id number] [Interpreters_FullName] : Jayden [TWNoteComboBox1] : [place]

## 2023-05-08 DIAGNOSIS — E83.51 HYPOCALCEMIA: ICD-10-CM

## 2023-05-08 DIAGNOSIS — K59.00 CONSTIPATION, UNSPECIFIED: ICD-10-CM

## 2023-05-08 DIAGNOSIS — F32.A DEPRESSION, UNSPECIFIED: ICD-10-CM

## 2023-05-08 DIAGNOSIS — Z00.00 ENCOUNTER FOR GENERAL ADULT MEDICAL EXAMINATION WITHOUT ABNORMAL FINDINGS: ICD-10-CM

## 2023-05-08 DIAGNOSIS — R63.4 ABNORMAL WEIGHT LOSS: ICD-10-CM

## 2023-05-25 ENCOUNTER — RESULT REVIEW (OUTPATIENT)
Age: 58
End: 2023-05-25

## 2023-05-27 ENCOUNTER — APPOINTMENT (OUTPATIENT)
Dept: CT IMAGING | Facility: CLINIC | Age: 58
End: 2023-05-27

## 2023-06-03 ENCOUNTER — APPOINTMENT (OUTPATIENT)
Dept: CT IMAGING | Facility: CLINIC | Age: 58
End: 2023-06-03
Payer: MEDICAID

## 2023-06-03 ENCOUNTER — OUTPATIENT (OUTPATIENT)
Dept: OUTPATIENT SERVICES | Facility: HOSPITAL | Age: 58
LOS: 1 days | End: 2023-06-03
Payer: MEDICAID

## 2023-06-03 DIAGNOSIS — Z00.8 ENCOUNTER FOR OTHER GENERAL EXAMINATION: ICD-10-CM

## 2023-06-03 DIAGNOSIS — R63.4 ABNORMAL WEIGHT LOSS: ICD-10-CM

## 2023-06-03 PROCEDURE — 74177 CT ABD & PELVIS W/CONTRAST: CPT | Mod: 26

## 2023-06-03 PROCEDURE — 74177 CT ABD & PELVIS W/CONTRAST: CPT

## 2023-06-07 ENCOUNTER — NON-APPOINTMENT (OUTPATIENT)
Age: 58
End: 2023-06-07

## 2023-11-08 RX ORDER — OMEPRAZOLE 20 MG/1
20 CAPSULE, DELAYED RELEASE ORAL
Qty: 180 | Refills: 3 | Status: ACTIVE | COMMUNITY
Start: 2019-03-04 | End: 1900-01-01

## 2024-11-27 ENCOUNTER — RX RENEWAL (OUTPATIENT)
Age: 59
End: 2024-11-27

## 2025-01-06 ENCOUNTER — RX RENEWAL (OUTPATIENT)
Age: 60
End: 2025-01-06

## 2025-01-06 RX ORDER — CALCIUM CARBONATE/VITAMIN D3 600 MG-10
600-10 TABLET ORAL
Qty: 30 | Refills: 3 | Status: ACTIVE | COMMUNITY
Start: 2025-01-06 | End: 1900-01-01

## 2025-03-10 ENCOUNTER — APPOINTMENT (OUTPATIENT)
Dept: RHEUMATOLOGY | Facility: CLINIC | Age: 60
End: 2025-03-10
Payer: MEDICAID

## 2025-03-10 ENCOUNTER — NON-APPOINTMENT (OUTPATIENT)
Age: 60
End: 2025-03-10

## 2025-03-10 VITALS
SYSTOLIC BLOOD PRESSURE: 118 MMHG | HEART RATE: 78 BPM | RESPIRATION RATE: 16 BRPM | HEIGHT: 63 IN | OXYGEN SATURATION: 98 % | DIASTOLIC BLOOD PRESSURE: 79 MMHG

## 2025-03-10 DIAGNOSIS — Z79.899 OTHER LONG TERM (CURRENT) DRUG THERAPY: ICD-10-CM

## 2025-03-10 DIAGNOSIS — M32.14 GLOMERULAR DISEASE IN SYSTEMIC LUPUS ERYTHEMATOSUS: ICD-10-CM

## 2025-03-10 DIAGNOSIS — M19.90 UNSPECIFIED OSTEOARTHRITIS, UNSPECIFIED SITE: ICD-10-CM

## 2025-03-10 DIAGNOSIS — E03.9 HYPOTHYROIDISM, UNSPECIFIED: ICD-10-CM

## 2025-03-10 DIAGNOSIS — M85.80 OTHER SPECIFIED DISORDERS OF BONE DENSITY AND STRUCTURE, UNSPECIFIED SITE: ICD-10-CM

## 2025-03-10 DIAGNOSIS — M32.9 SYSTEMIC LUPUS ERYTHEMATOSUS, UNSPECIFIED: ICD-10-CM

## 2025-03-10 DIAGNOSIS — Z79.60 LONG TERM (CURRENT) USE OF UNSPECIFIED IMMUNOMODULATORS AND IMMUNOSUPPRESSANTS: ICD-10-CM

## 2025-03-10 LAB
BASOPHILS # BLD AUTO: 0.01 K/UL
BASOPHILS NFR BLD AUTO: 0.2 %
EOSINOPHIL # BLD AUTO: 0.02 K/UL
EOSINOPHIL NFR BLD AUTO: 0.4 %
ESTIMATED AVERAGE GLUCOSE: 120 MG/DL
HBA1C MFR BLD HPLC: 5.8 %
HCT VFR BLD CALC: 34.5 %
HGB BLD-MCNC: 11.4 G/DL
IMM GRANULOCYTES NFR BLD AUTO: 0.2 %
LYMPHOCYTES # BLD AUTO: 1.58 K/UL
LYMPHOCYTES NFR BLD AUTO: 33.5 %
MAN DIFF?: NORMAL
MCHC RBC-ENTMCNC: 30 PG
MCHC RBC-ENTMCNC: 33 G/DL
MCV RBC AUTO: 90.8 FL
MONOCYTES # BLD AUTO: 0.46 K/UL
MONOCYTES NFR BLD AUTO: 9.7 %
NEUTROPHILS # BLD AUTO: 2.64 K/UL
NEUTROPHILS NFR BLD AUTO: 56 %
PLATELET # BLD AUTO: 232 K/UL
RBC # BLD: 3.8 M/UL
RBC # FLD: 13.5 %
WBC # FLD AUTO: 4.72 K/UL

## 2025-03-10 PROCEDURE — G0009: CPT

## 2025-03-10 PROCEDURE — 99214 OFFICE O/P EST MOD 30 MIN: CPT | Mod: 25

## 2025-03-10 PROCEDURE — 90677 PCV20 VACCINE IM: CPT

## 2025-03-11 ENCOUNTER — APPOINTMENT (OUTPATIENT)
Dept: ENDOCRINOLOGY | Facility: CLINIC | Age: 60
End: 2025-03-11
Payer: MEDICAID

## 2025-03-11 LAB
25(OH)D3 SERPL-MCNC: 34.4 NG/ML
ALBUMIN SERPL ELPH-MCNC: 3.9 G/DL
ALP BLD-CCNC: 76 U/L
ALT SERPL-CCNC: 16 U/L
ANION GAP SERPL CALC-SCNC: 9 MMOL/L
APPEARANCE: CLEAR
AST SERPL-CCNC: 23 U/L
BACTERIA: NEGATIVE /HPF
BILIRUB SERPL-MCNC: 0.4 MG/DL
BILIRUBIN URINE: NEGATIVE
BLOOD URINE: NEGATIVE
BUN SERPL-MCNC: 30 MG/DL
C3 SERPL-MCNC: 98 MG/DL
C4 SERPL-MCNC: 21 MG/DL
CALCIUM SERPL-MCNC: 9.3 MG/DL
CAST: 0 /LPF
CHLORIDE SERPL-SCNC: 108 MMOL/L
CHOLEST SERPL-MCNC: 175 MG/DL
CK SERPL-CCNC: 69 U/L
CO2 SERPL-SCNC: 25 MMOL/L
COLOR: YELLOW
CREAT SERPL-MCNC: 1.08 MG/DL
CREAT SPEC-SCNC: 56 MG/DL
CREAT/PROT UR: 0.5 RATIO
DSDNA AB SER-ACNC: 1 IU/ML
EGFRCR SERPLBLD CKD-EPI 2021: 58 ML/MIN/1.73M2
ENA RNP AB SER IA-ACNC: 1.9 AL
ENA SM AB SER IA-ACNC: <0.2 AL
ENA SS-A AB SER IA-ACNC: <0.2 AL
ENA SS-B AB SER IA-ACNC: <0.2 AL
EPITHELIAL CELLS: 0 /HPF
GLUCOSE QUALITATIVE U: NEGATIVE MG/DL
HDLC SERPL-MCNC: 61 MG/DL
KETONES URINE: NEGATIVE MG/DL
LDLC SERPL CALC-MCNC: 86 MG/DL
LEUKOCYTE ESTERASE URINE: NEGATIVE
MICROSCOPIC-UA: NORMAL
NITRITE URINE: NEGATIVE
NONHDLC SERPL-MCNC: 114 MG/DL
PH URINE: 6
POTASSIUM SERPL-SCNC: 4.7 MMOL/L
PROT SERPL-MCNC: 6.6 G/DL
PROT UR-MCNC: 27 MG/DL
PROTEIN URINE: NORMAL MG/DL
RED BLOOD CELLS URINE: 1 /HPF
SODIUM SERPL-SCNC: 142 MMOL/L
SPECIFIC GRAVITY URINE: 1.01
TRIGL SERPL-MCNC: 162 MG/DL
TSH SERPL-ACNC: 2.8 UIU/ML
URATE SERPL-MCNC: 5 MG/DL
UROBILINOGEN URINE: 0.2 MG/DL
WHITE BLOOD CELLS URINE: 0 /HPF

## 2025-03-11 PROCEDURE — 77080 DXA BONE DENSITY AXIAL: CPT

## 2025-03-13 ENCOUNTER — APPOINTMENT (OUTPATIENT)
Dept: CARDIOLOGY | Facility: CLINIC | Age: 60
End: 2025-03-13
Payer: MEDICAID

## 2025-03-13 PROCEDURE — 93306 TTE W/DOPPLER COMPLETE: CPT

## 2025-03-31 ENCOUNTER — LABORATORY RESULT (OUTPATIENT)
Age: 60
End: 2025-03-31

## 2025-03-31 ENCOUNTER — APPOINTMENT (OUTPATIENT)
Dept: RHEUMATOLOGY | Facility: CLINIC | Age: 60
End: 2025-03-31
Payer: MEDICAID

## 2025-03-31 VITALS
SYSTOLIC BLOOD PRESSURE: 118 MMHG | WEIGHT: 108.13 LBS | HEIGHT: 63 IN | OXYGEN SATURATION: 98 % | RESPIRATION RATE: 16 BRPM | DIASTOLIC BLOOD PRESSURE: 77 MMHG | HEART RATE: 77 BPM | BODY MASS INDEX: 19.16 KG/M2

## 2025-03-31 DIAGNOSIS — Z79.60 LONG TERM (CURRENT) USE OF UNSPECIFIED IMMUNOMODULATORS AND IMMUNOSUPPRESSANTS: ICD-10-CM

## 2025-03-31 DIAGNOSIS — G89.29 PAIN IN RIGHT HIP: ICD-10-CM

## 2025-03-31 DIAGNOSIS — M25.551 PAIN IN RIGHT HIP: ICD-10-CM

## 2025-03-31 DIAGNOSIS — M32.9 SYSTEMIC LUPUS ERYTHEMATOSUS, UNSPECIFIED: ICD-10-CM

## 2025-03-31 DIAGNOSIS — Z79.899 OTHER LONG TERM (CURRENT) DRUG THERAPY: ICD-10-CM

## 2025-03-31 DIAGNOSIS — M54.9 DORSALGIA, UNSPECIFIED: ICD-10-CM

## 2025-03-31 DIAGNOSIS — I38 ENDOCARDITIS, VALVE UNSPECIFIED: ICD-10-CM

## 2025-03-31 DIAGNOSIS — M32.14 GLOMERULAR DISEASE IN SYSTEMIC LUPUS ERYTHEMATOSUS: ICD-10-CM

## 2025-03-31 DIAGNOSIS — M81.0 AGE-RELATED OSTEOPOROSIS W/OUT CURRENT PATHOLOGICAL FRACTURE: ICD-10-CM

## 2025-03-31 PROCEDURE — 99214 OFFICE O/P EST MOD 30 MIN: CPT

## 2025-03-31 PROCEDURE — G2211 COMPLEX E/M VISIT ADD ON: CPT | Mod: NC

## 2025-04-01 ENCOUNTER — APPOINTMENT (OUTPATIENT)
Dept: RADIOLOGY | Facility: CLINIC | Age: 60
End: 2025-04-01
Payer: MEDICAID

## 2025-04-01 ENCOUNTER — OUTPATIENT (OUTPATIENT)
Dept: OUTPATIENT SERVICES | Facility: HOSPITAL | Age: 60
LOS: 1 days | End: 2025-04-01
Payer: MEDICAID

## 2025-04-01 DIAGNOSIS — M25.551 PAIN IN RIGHT HIP: ICD-10-CM

## 2025-04-01 PROCEDURE — 72100 X-RAY EXAM L-S SPINE 2/3 VWS: CPT | Mod: 26

## 2025-04-01 PROCEDURE — 72100 X-RAY EXAM L-S SPINE 2/3 VWS: CPT

## 2025-04-01 PROCEDURE — 72170 X-RAY EXAM OF PELVIS: CPT | Mod: 26

## 2025-04-01 PROCEDURE — 72170 X-RAY EXAM OF PELVIS: CPT

## 2025-04-02 RX ORDER — ZOLEDRONIC ACID 5 MG/100ML
5 INJECTION INTRAVENOUS
Refills: 0 | Status: COMPLETED | OUTPATIENT
Start: 2025-04-02 | End: 1900-01-01

## 2025-04-02 RX ADMIN — ZOLEDRONIC ACID 5 MG/100ML: 5 INJECTION INTRAVENOUS at 00:00

## 2025-04-08 ENCOUNTER — APPOINTMENT (OUTPATIENT)
Dept: RHEUMATOLOGY | Facility: CLINIC | Age: 60
End: 2025-04-08
Payer: MEDICAID

## 2025-04-08 VITALS
OXYGEN SATURATION: 97 % | HEART RATE: 64 BPM | RESPIRATION RATE: 16 BRPM | DIASTOLIC BLOOD PRESSURE: 65 MMHG | SYSTOLIC BLOOD PRESSURE: 98 MMHG

## 2025-04-08 VITALS
DIASTOLIC BLOOD PRESSURE: 62 MMHG | TEMPERATURE: 98 F | RESPIRATION RATE: 16 BRPM | SYSTOLIC BLOOD PRESSURE: 103 MMHG | OXYGEN SATURATION: 97 % | HEART RATE: 67 BPM

## 2025-04-08 PROCEDURE — 96374 THER/PROPH/DIAG INJ IV PUSH: CPT

## 2025-04-08 RX ORDER — ZOLEDRONIC ACID 5 MG/100ML
5 INJECTION INTRAVENOUS
Qty: 0 | Refills: 0 | Status: COMPLETED
Start: 2025-04-02

## 2025-06-10 ENCOUNTER — APPOINTMENT (OUTPATIENT)
Dept: CARDIOLOGY | Facility: CLINIC | Age: 60
End: 2025-06-10

## 2025-07-07 ENCOUNTER — RX RENEWAL (OUTPATIENT)
Age: 60
End: 2025-07-07